# Patient Record
Sex: FEMALE | Race: WHITE | NOT HISPANIC OR LATINO | Employment: OTHER | URBAN - METROPOLITAN AREA
[De-identification: names, ages, dates, MRNs, and addresses within clinical notes are randomized per-mention and may not be internally consistent; named-entity substitution may affect disease eponyms.]

---

## 2021-11-10 ENCOUNTER — OFFICE VISIT (OUTPATIENT)
Dept: INTERNAL MEDICINE | Facility: CLINIC | Age: 66
End: 2021-11-10
Payer: MEDICARE

## 2021-11-10 ENCOUNTER — LAB VISIT (OUTPATIENT)
Dept: LAB | Facility: HOSPITAL | Age: 66
End: 2021-11-10
Attending: FAMILY MEDICINE
Payer: MEDICARE

## 2021-11-10 VITALS
DIASTOLIC BLOOD PRESSURE: 80 MMHG | HEART RATE: 92 BPM | OXYGEN SATURATION: 98 % | SYSTOLIC BLOOD PRESSURE: 162 MMHG | WEIGHT: 115.31 LBS | HEIGHT: 58 IN | BODY MASS INDEX: 24.2 KG/M2

## 2021-11-10 DIAGNOSIS — I10 HYPERTENSION, ESSENTIAL: Primary | ICD-10-CM

## 2021-11-10 DIAGNOSIS — Z12.11 SCREEN FOR COLON CANCER: ICD-10-CM

## 2021-11-10 DIAGNOSIS — Z12.31 ENCOUNTER FOR SCREENING MAMMOGRAM FOR MALIGNANT NEOPLASM OF BREAST: ICD-10-CM

## 2021-11-10 DIAGNOSIS — I10 HYPERTENSION, ESSENTIAL: ICD-10-CM

## 2021-11-10 DIAGNOSIS — Z23 NEED FOR 23-POLYVALENT PNEUMOCOCCAL POLYSACCHARIDE VACCINE: ICD-10-CM

## 2021-11-10 DIAGNOSIS — E11.9 CONTROLLED TYPE 2 DIABETES MELLITUS WITHOUT COMPLICATION, WITHOUT LONG-TERM CURRENT USE OF INSULIN: ICD-10-CM

## 2021-11-10 DIAGNOSIS — E78.5 HYPERLIPIDEMIA, UNSPECIFIED HYPERLIPIDEMIA TYPE: ICD-10-CM

## 2021-11-10 LAB
ALBUMIN/CREAT UR: NORMAL UG/MG (ref 0–30)
BACTERIA #/AREA URNS AUTO: ABNORMAL /HPF
BILIRUB UR QL STRIP: NEGATIVE
CLARITY UR REFRACT.AUTO: ABNORMAL
COLOR UR AUTO: YELLOW
CREAT UR-MCNC: 37 MG/DL (ref 15–325)
GLUCOSE UR QL STRIP: NEGATIVE
HGB UR QL STRIP: NEGATIVE
KETONES UR QL STRIP: NEGATIVE
LEUKOCYTE ESTERASE UR QL STRIP: ABNORMAL
MICROALBUMIN UR DL<=1MG/L-MCNC: <5 UG/ML
MICROSCOPIC COMMENT: ABNORMAL
NITRITE UR QL STRIP: NEGATIVE
PH UR STRIP: 6 [PH] (ref 5–8)
PROT UR QL STRIP: NEGATIVE
RBC #/AREA URNS AUTO: 0 /HPF (ref 0–4)
SP GR UR STRIP: 1.01 (ref 1–1.03)
SQUAMOUS #/AREA URNS AUTO: 2 /HPF
URN SPEC COLLECT METH UR: ABNORMAL
WBC #/AREA URNS AUTO: 26 /HPF (ref 0–5)

## 2021-11-10 PROCEDURE — 99203 OFFICE O/P NEW LOW 30 MIN: CPT | Mod: 25,S$GLB,, | Performed by: FAMILY MEDICINE

## 2021-11-10 PROCEDURE — 3008F BODY MASS INDEX DOCD: CPT | Mod: S$GLB,,, | Performed by: FAMILY MEDICINE

## 2021-11-10 PROCEDURE — 3288F PR FALLS RISK ASSESSMENT DOCUMENTED: ICD-10-PCS | Mod: S$GLB,,, | Performed by: FAMILY MEDICINE

## 2021-11-10 PROCEDURE — 3066F NEPHROPATHY DOC TX: CPT | Mod: S$GLB,,, | Performed by: FAMILY MEDICINE

## 2021-11-10 PROCEDURE — 82570 ASSAY OF URINE CREATININE: CPT | Performed by: FAMILY MEDICINE

## 2021-11-10 PROCEDURE — 3044F PR MOST RECENT HEMOGLOBIN A1C LEVEL <7.0%: ICD-10-PCS | Mod: S$GLB,,, | Performed by: FAMILY MEDICINE

## 2021-11-10 PROCEDURE — 90732 PPSV23 VACC 2 YRS+ SUBQ/IM: CPT | Mod: S$GLB,,, | Performed by: FAMILY MEDICINE

## 2021-11-10 PROCEDURE — 3288F FALL RISK ASSESSMENT DOCD: CPT | Mod: S$GLB,,, | Performed by: FAMILY MEDICINE

## 2021-11-10 PROCEDURE — 99999 PR PBB SHADOW E&M-NEW PATIENT-LVL III: CPT | Mod: PBBFAC,,, | Performed by: FAMILY MEDICINE

## 2021-11-10 PROCEDURE — 3008F PR BODY MASS INDEX (BMI) DOCUMENTED: ICD-10-PCS | Mod: S$GLB,,, | Performed by: FAMILY MEDICINE

## 2021-11-10 PROCEDURE — G0009 ADMIN PNEUMOCOCCAL VACCINE: HCPCS | Mod: S$GLB,,, | Performed by: FAMILY MEDICINE

## 2021-11-10 PROCEDURE — 1159F PR MEDICATION LIST DOCUMENTED IN MEDICAL RECORD: ICD-10-PCS | Mod: S$GLB,,, | Performed by: FAMILY MEDICINE

## 2021-11-10 PROCEDURE — 1101F PT FALLS ASSESS-DOCD LE1/YR: CPT | Mod: S$GLB,,, | Performed by: FAMILY MEDICINE

## 2021-11-10 PROCEDURE — 1159F MED LIST DOCD IN RCRD: CPT | Mod: S$GLB,,, | Performed by: FAMILY MEDICINE

## 2021-11-10 PROCEDURE — 1101F PR PT FALLS ASSESS DOC 0-1 FALLS W/OUT INJ PAST YR: ICD-10-PCS | Mod: S$GLB,,, | Performed by: FAMILY MEDICINE

## 2021-11-10 PROCEDURE — 99203 PR OFFICE/OUTPT VISIT, NEW, LEVL III, 30-44 MIN: ICD-10-PCS | Mod: 25,S$GLB,, | Performed by: FAMILY MEDICINE

## 2021-11-10 PROCEDURE — 3077F SYST BP >= 140 MM HG: CPT | Mod: S$GLB,,, | Performed by: FAMILY MEDICINE

## 2021-11-10 PROCEDURE — 3077F PR MOST RECENT SYSTOLIC BLOOD PRESSURE >= 140 MM HG: ICD-10-PCS | Mod: S$GLB,,, | Performed by: FAMILY MEDICINE

## 2021-11-10 PROCEDURE — 3044F HG A1C LEVEL LT 7.0%: CPT | Mod: S$GLB,,, | Performed by: FAMILY MEDICINE

## 2021-11-10 PROCEDURE — 3066F PR DOCUMENTATION OF TREATMENT FOR NEPHROPATHY: ICD-10-PCS | Mod: S$GLB,,, | Performed by: FAMILY MEDICINE

## 2021-11-10 PROCEDURE — 90732 PNEUMOCOCCAL POLYSACCHARIDE VACCINE 23-VALENT =>2YO SQ IM: ICD-10-PCS | Mod: S$GLB,,, | Performed by: FAMILY MEDICINE

## 2021-11-10 PROCEDURE — 1160F RVW MEDS BY RX/DR IN RCRD: CPT | Mod: S$GLB,,, | Performed by: FAMILY MEDICINE

## 2021-11-10 PROCEDURE — 3061F NEG MICROALBUMINURIA REV: CPT | Mod: S$GLB,,, | Performed by: FAMILY MEDICINE

## 2021-11-10 PROCEDURE — 81001 URINALYSIS AUTO W/SCOPE: CPT | Performed by: FAMILY MEDICINE

## 2021-11-10 PROCEDURE — 99999 PR PBB SHADOW E&M-NEW PATIENT-LVL III: ICD-10-PCS | Mod: PBBFAC,,, | Performed by: FAMILY MEDICINE

## 2021-11-10 PROCEDURE — 3079F DIAST BP 80-89 MM HG: CPT | Mod: S$GLB,,, | Performed by: FAMILY MEDICINE

## 2021-11-10 PROCEDURE — G0009 PNEUMOCOCCAL POLYSACCHARIDE VACCINE 23-VALENT =>2YO SQ IM: ICD-10-PCS | Mod: S$GLB,,, | Performed by: FAMILY MEDICINE

## 2021-11-10 PROCEDURE — 3079F PR MOST RECENT DIASTOLIC BLOOD PRESSURE 80-89 MM HG: ICD-10-PCS | Mod: S$GLB,,, | Performed by: FAMILY MEDICINE

## 2021-11-10 PROCEDURE — 1160F PR REVIEW ALL MEDS BY PRESCRIBER/CLIN PHARMACIST DOCUMENTED: ICD-10-PCS | Mod: S$GLB,,, | Performed by: FAMILY MEDICINE

## 2021-11-10 PROCEDURE — 3061F PR NEG MICROALBUMINURIA RESULT DOCUMENTED/REVIEW: ICD-10-PCS | Mod: S$GLB,,, | Performed by: FAMILY MEDICINE

## 2021-11-10 RX ORDER — METFORMIN HYDROCHLORIDE 500 MG/1
500 TABLET ORAL 2 TIMES DAILY WITH MEALS
COMMUNITY
End: 2022-01-25 | Stop reason: SDUPTHER

## 2021-11-10 RX ORDER — SIMVASTATIN 40 MG/1
40 TABLET, FILM COATED ORAL NIGHTLY
COMMUNITY
End: 2022-01-25 | Stop reason: SDUPTHER

## 2021-11-10 RX ORDER — CARVEDILOL 6.25 MG/1
6.25 TABLET ORAL 2 TIMES DAILY WITH MEALS
COMMUNITY
End: 2022-01-25 | Stop reason: SDUPTHER

## 2021-11-10 RX ORDER — LATANOPROST 50 UG/ML
1 SOLUTION/ DROPS OPHTHALMIC DAILY
COMMUNITY

## 2021-11-10 RX ORDER — BRIMONIDINE TARTRATE 1.5 MG/ML
1 SOLUTION/ DROPS OPHTHALMIC 2 TIMES DAILY
COMMUNITY

## 2021-11-10 RX ORDER — IRBESARTAN AND HYDROCHLOROTHIAZIDE 300; 12.5 MG/1; MG/1
1 TABLET, FILM COATED ORAL DAILY
COMMUNITY
End: 2022-01-25 | Stop reason: SDUPTHER

## 2021-11-10 RX ORDER — FENOFIBRATE 160 MG/1
160 TABLET ORAL DAILY
COMMUNITY
End: 2022-01-25 | Stop reason: SDUPTHER

## 2021-11-11 ENCOUNTER — LAB VISIT (OUTPATIENT)
Dept: LAB | Facility: HOSPITAL | Age: 66
End: 2021-11-11
Attending: FAMILY MEDICINE
Payer: MEDICARE

## 2021-11-11 DIAGNOSIS — I10 HYPERTENSION, ESSENTIAL: ICD-10-CM

## 2021-11-11 DIAGNOSIS — E11.9 CONTROLLED TYPE 2 DIABETES MELLITUS WITHOUT COMPLICATION, WITHOUT LONG-TERM CURRENT USE OF INSULIN: ICD-10-CM

## 2021-11-11 LAB
ALBUMIN SERPL BCP-MCNC: 3.9 G/DL (ref 3.5–5.2)
ALP SERPL-CCNC: 38 U/L (ref 55–135)
ALT SERPL W/O P-5'-P-CCNC: 11 U/L (ref 10–44)
ANION GAP SERPL CALC-SCNC: 7 MMOL/L (ref 8–16)
AST SERPL-CCNC: 17 U/L (ref 10–40)
BASOPHILS # BLD AUTO: 0.08 K/UL (ref 0–0.2)
BASOPHILS NFR BLD: 1.2 % (ref 0–1.9)
BILIRUB SERPL-MCNC: 0.8 MG/DL (ref 0.1–1)
BUN SERPL-MCNC: 21 MG/DL (ref 8–23)
CALCIUM SERPL-MCNC: 9.8 MG/DL (ref 8.7–10.5)
CHLORIDE SERPL-SCNC: 99 MMOL/L (ref 95–110)
CHOLEST SERPL-MCNC: 126 MG/DL (ref 120–199)
CHOLEST/HDLC SERPL: 3.2 {RATIO} (ref 2–5)
CO2 SERPL-SCNC: 25 MMOL/L (ref 23–29)
CREAT SERPL-MCNC: 1.3 MG/DL (ref 0.5–1.4)
DIFFERENTIAL METHOD: ABNORMAL
EOSINOPHIL # BLD AUTO: 0.5 K/UL (ref 0–0.5)
EOSINOPHIL NFR BLD: 8.4 % (ref 0–8)
ERYTHROCYTE [DISTWIDTH] IN BLOOD BY AUTOMATED COUNT: 13 % (ref 11.5–14.5)
EST. GFR  (AFRICAN AMERICAN): 49.4 ML/MIN/1.73 M^2
EST. GFR  (NON AFRICAN AMERICAN): 42.9 ML/MIN/1.73 M^2
ESTIMATED AVG GLUCOSE: 134 MG/DL (ref 68–131)
GLUCOSE SERPL-MCNC: 144 MG/DL (ref 70–110)
HBA1C MFR BLD: 6.3 % (ref 4–5.6)
HCT VFR BLD AUTO: 34.1 % (ref 37–48.5)
HDLC SERPL-MCNC: 40 MG/DL (ref 40–75)
HDLC SERPL: 31.7 % (ref 20–50)
HGB BLD-MCNC: 11.4 G/DL (ref 12–16)
IMM GRANULOCYTES # BLD AUTO: 0.02 K/UL (ref 0–0.04)
IMM GRANULOCYTES NFR BLD AUTO: 0.3 % (ref 0–0.5)
LDLC SERPL CALC-MCNC: 60.8 MG/DL (ref 63–159)
LYMPHOCYTES # BLD AUTO: 2.8 K/UL (ref 1–4.8)
LYMPHOCYTES NFR BLD: 43.8 % (ref 18–48)
MCH RBC QN AUTO: 29.4 PG (ref 27–31)
MCHC RBC AUTO-ENTMCNC: 33.4 G/DL (ref 32–36)
MCV RBC AUTO: 88 FL (ref 82–98)
MONOCYTES # BLD AUTO: 0.3 K/UL (ref 0.3–1)
MONOCYTES NFR BLD: 4.7 % (ref 4–15)
NEUTROPHILS # BLD AUTO: 2.7 K/UL (ref 1.8–7.7)
NEUTROPHILS NFR BLD: 41.6 % (ref 38–73)
NONHDLC SERPL-MCNC: 86 MG/DL
NRBC BLD-RTO: 0 /100 WBC
PLATELET # BLD AUTO: 290 K/UL (ref 150–450)
PMV BLD AUTO: 9.3 FL (ref 9.2–12.9)
POTASSIUM SERPL-SCNC: 4 MMOL/L (ref 3.5–5.1)
PROT SERPL-MCNC: 6.9 G/DL (ref 6–8.4)
RBC # BLD AUTO: 3.88 M/UL (ref 4–5.4)
SODIUM SERPL-SCNC: 131 MMOL/L (ref 136–145)
TRIGL SERPL-MCNC: 126 MG/DL (ref 30–150)
TSH SERPL DL<=0.005 MIU/L-ACNC: 1.44 UIU/ML (ref 0.4–4)
WBC # BLD AUTO: 6.42 K/UL (ref 3.9–12.7)

## 2021-11-11 PROCEDURE — 80061 LIPID PANEL: CPT | Performed by: FAMILY MEDICINE

## 2021-11-11 PROCEDURE — 80053 COMPREHEN METABOLIC PANEL: CPT | Performed by: FAMILY MEDICINE

## 2021-11-11 PROCEDURE — 86803 HEPATITIS C AB TEST: CPT | Performed by: FAMILY MEDICINE

## 2021-11-11 PROCEDURE — 83036 HEMOGLOBIN GLYCOSYLATED A1C: CPT | Performed by: FAMILY MEDICINE

## 2021-11-11 PROCEDURE — 84443 ASSAY THYROID STIM HORMONE: CPT | Performed by: FAMILY MEDICINE

## 2021-11-11 PROCEDURE — 36415 COLL VENOUS BLD VENIPUNCTURE: CPT | Performed by: FAMILY MEDICINE

## 2021-11-11 PROCEDURE — 85025 COMPLETE CBC W/AUTO DIFF WBC: CPT | Performed by: FAMILY MEDICINE

## 2021-11-11 PROCEDURE — 87389 HIV-1 AG W/HIV-1&-2 AB AG IA: CPT | Performed by: FAMILY MEDICINE

## 2021-11-12 LAB
HCV AB SERPL QL IA: NEGATIVE
HIV 1+2 AB+HIV1 P24 AG SERPL QL IA: NEGATIVE

## 2021-11-15 DIAGNOSIS — R82.998 URINE WBC INCREASED: Primary | ICD-10-CM

## 2022-01-25 NOTE — TELEPHONE ENCOUNTER
No new care gaps identified.  Powered by [employer]. Reference number: [id number].   1/25/2022 3:41:12 PM CST

## 2022-01-25 NOTE — TELEPHONE ENCOUNTER
----- Message from Nelida Haven sent at 1/25/2022  3:31 PM CST -----  Contact: Self   Requesting an RX refill or new RX.  Is this a refill or new RX:   RX name and strength (copy/paste from chart):  all rx  Is this a 30 day or 90 day RX:   Patient advised that in the future they can use their [facility] account to request a refill?:    Patient advised that RX refills can take up to 72 hours?: yes   Pharmacy name and phone # (copy/paste from chart):  [id number]  [address], [place] [id number] [phone]  Comments:

## 2022-01-26 RX ORDER — METFORMIN HYDROCHLORIDE 500 MG/1
500 TABLET ORAL 2 TIMES DAILY WITH MEALS
Qty: 180 TABLET | Refills: 0 | Status: SHIPPED | OUTPATIENT
Start: 2022-01-26 | End: 2022-10-06 | Stop reason: SDUPTHER

## 2022-01-26 RX ORDER — IRBESARTAN AND HYDROCHLOROTHIAZIDE 300; 12.5 MG/1; MG/1
1 TABLET, FILM COATED ORAL DAILY
Qty: 90 TABLET | Refills: 0 | Status: SHIPPED | OUTPATIENT
Start: 2022-01-26 | End: 2022-10-06 | Stop reason: SDUPTHER

## 2022-01-26 RX ORDER — FENOFIBRATE 160 MG/1
160 TABLET ORAL DAILY
Qty: 90 TABLET | Refills: 0 | Status: SHIPPED | OUTPATIENT
Start: 2022-01-26 | End: 2022-07-26 | Stop reason: SDUPTHER

## 2022-01-26 RX ORDER — SIMVASTATIN 40 MG/1
40 TABLET, FILM COATED ORAL NIGHTLY
Qty: 90 TABLET | Refills: 0 | Status: SHIPPED | OUTPATIENT
Start: 2022-01-26 | End: 2022-07-26 | Stop reason: SDUPTHER

## 2022-01-26 RX ORDER — CARVEDILOL 6.25 MG/1
6.25 TABLET ORAL 2 TIMES DAILY WITH MEALS
Qty: 180 TABLET | Refills: 0 | Status: SHIPPED | OUTPATIENT
Start: 2022-01-26 | End: 2022-10-06 | Stop reason: SDUPTHER

## 2022-05-09 ENCOUNTER — TELEPHONE (OUTPATIENT)
Dept: INTERNAL MEDICINE | Facility: CLINIC | Age: 67
End: 2022-05-09
Payer: MEDICARE

## 2022-05-09 NOTE — TELEPHONE ENCOUNTER
----- Message from Genie Atkinson sent at 5/9/2022  3:07 PM CDT -----  Regarding: refill request  Contact: patient [phone]  Requesting an RX refill or new RX.  Is this a refill or new RX: refill  RX name and strength (irbesartan-hydrochlorothiazide (AVALIDE) 300-12.5 mg per tablet  Is this a 30 day or 90 day RX: 90  Pharm[facility] name and phone #   [facility] [address] [place] [address] AT [address] [address]  Phone: [phone] Fax: [phone]  The doctors have asked that we provide their patients with the following 2 reminders -- prescription refills can take up to 72 hours, and a friendly reminder that in the future you can use your [facility] account to request refills: yes    2  Requesting an RX refill or new RX.  Is this a refill or new RX: refill  RX name and strength (simvastatin (ZOCOR) 40 MG tablet  Is this a 30 day or 90 day RX: 90  Pharmacy name and phone #   [facility] [address] [place] [address] [employer] AT [address] [address]  Phone: [phone] Fax: [phone]  The doctors have asked that we provide their patients with the following 2 reminders -- prescription refills can take up to 72 hours, and a friendly reminder that in the future you can use your [facility] account to request refills: yes      3  Requesting an RX refill or new RX.  Is this a refill or new RX: refill  RX name and strength (carvediloL (COREG) 6.25 MG tablet    Is this a 30 day or 90 day RX: 90  Pharmacy name and phone #   [facility] [address] [place], [place] [address] AT [address] [address]  Phone: [phone] Fax: [phone]  The doctors have asked that we provide their patients with the following 2 reminders -- prescription refills can take up to 72 hours, and a friendly reminder that in  the future you can use your [facility] account to request refills: yes      4  Requesting an RX refill or new RX.  Is this a refill or new RX: refill  RX name and strength fenofibrate 160 MG Tab   Is this a 30 day or 90 day RX: 90  Pharmacy name and phone #   [id number] [address] AT [address] [address]  Phone: [phone] Fax: [phone]  The doctors have asked that we provide their patients with the following 2 reminders -- prescription refills can take up to 72 hours, and a friendly reminder that in the future you can use your [facility] account to request refills: yes    5  Requesting an RX refill or new RX.  Is this a refill or new RX: refill  RX name and strength (metFORMIN (GLUCOPHAGE) 500 MG tablet  Is this a 30 day or 90 day RX: 90  Pharmacy name and phone #   [id number] [address] AT [address] [address]  Phone: [phone] Fax: [phone]  The doctors have asked that we provide their patients with the following 2 reminders -- prescription refills can take up to 72 hours, and a friendly reminder that in the future you can use your [facility] account to request refills: yes

## 2022-07-26 ENCOUNTER — OFFICE VISIT (OUTPATIENT)
Dept: INTERNAL MEDICINE | Facility: CLINIC | Age: 67
End: 2022-07-26
Payer: MEDICARE

## 2022-07-26 VITALS
WEIGHT: 116.19 LBS | BODY MASS INDEX: 23.42 KG/M2 | OXYGEN SATURATION: 96 % | DIASTOLIC BLOOD PRESSURE: 76 MMHG | HEIGHT: 59 IN | SYSTOLIC BLOOD PRESSURE: 132 MMHG | HEART RATE: 83 BPM

## 2022-07-26 DIAGNOSIS — I10 HYPERTENSION, ESSENTIAL: Primary | ICD-10-CM

## 2022-07-26 DIAGNOSIS — Z29.9 PREVENTIVE MEASURE: ICD-10-CM

## 2022-07-26 DIAGNOSIS — E11.9 CONTROLLED TYPE 2 DIABETES MELLITUS WITHOUT COMPLICATION, WITHOUT LONG-TERM CURRENT USE OF INSULIN: ICD-10-CM

## 2022-07-26 DIAGNOSIS — E78.5 HYPERLIPIDEMIA, UNSPECIFIED HYPERLIPIDEMIA TYPE: ICD-10-CM

## 2022-07-26 DIAGNOSIS — E28.39 ESTROGEN DEFICIENCY: ICD-10-CM

## 2022-07-26 DIAGNOSIS — Z12.11 COLON CANCER SCREENING: ICD-10-CM

## 2022-07-26 DIAGNOSIS — I10 PRIMARY HYPERTENSION: ICD-10-CM

## 2022-07-26 DIAGNOSIS — Z12.31 ENCOUNTER FOR SCREENING MAMMOGRAM FOR MALIGNANT NEOPLASM OF BREAST: ICD-10-CM

## 2022-07-26 DIAGNOSIS — E78.2 MIXED HYPERLIPIDEMIA: ICD-10-CM

## 2022-07-26 LAB
ALBUMIN/CREAT UR: 13.4 UG/MG (ref 0–30)
CREAT UR-MCNC: 82 MG/DL (ref 15–325)
MICROALBUMIN UR DL<=1MG/L-MCNC: 11 UG/ML

## 2022-07-26 PROCEDURE — G0009 ADMIN PNEUMOCOCCAL VACCINE: HCPCS | Mod: 59,S$GLB,, | Performed by: INTERNAL MEDICINE

## 2022-07-26 PROCEDURE — 99999 PR PBB SHADOW E&M-EST. PATIENT-LVL V: CPT | Mod: PBBFAC,,, | Performed by: INTERNAL MEDICINE

## 2022-07-26 PROCEDURE — 1159F MED LIST DOCD IN RCRD: CPT | Mod: CPTII,S$GLB,, | Performed by: INTERNAL MEDICINE

## 2022-07-26 PROCEDURE — 90471 IMMUNIZATION ADMIN: CPT | Mod: GA,S$GLB,, | Performed by: INTERNAL MEDICINE

## 2022-07-26 PROCEDURE — 3078F PR MOST RECENT DIASTOLIC BLOOD PRESSURE < 80 MM HG: ICD-10-PCS | Mod: CPTII,S$GLB,, | Performed by: INTERNAL MEDICINE

## 2022-07-26 PROCEDURE — 3075F SYST BP GE 130 - 139MM HG: CPT | Mod: CPTII,S$GLB,, | Performed by: INTERNAL MEDICINE

## 2022-07-26 PROCEDURE — 1159F PR MEDICATION LIST DOCUMENTED IN MEDICAL RECORD: ICD-10-PCS | Mod: CPTII,S$GLB,, | Performed by: INTERNAL MEDICINE

## 2022-07-26 PROCEDURE — 3008F BODY MASS INDEX DOCD: CPT | Mod: CPTII,S$GLB,, | Performed by: INTERNAL MEDICINE

## 2022-07-26 PROCEDURE — 1101F PR PT FALLS ASSESS DOC 0-1 FALLS W/OUT INJ PAST YR: ICD-10-PCS | Mod: CPTII,S$GLB,, | Performed by: INTERNAL MEDICINE

## 2022-07-26 PROCEDURE — 99215 PR OFFICE/OUTPT VISIT, EST, LEVL V, 40-54 MIN: ICD-10-PCS | Mod: 25,S$GLB,, | Performed by: INTERNAL MEDICINE

## 2022-07-26 PROCEDURE — 99215 OFFICE O/P EST HI 40 MIN: CPT | Mod: 25,S$GLB,, | Performed by: INTERNAL MEDICINE

## 2022-07-26 PROCEDURE — 1101F PT FALLS ASSESS-DOCD LE1/YR: CPT | Mod: CPTII,S$GLB,, | Performed by: INTERNAL MEDICINE

## 2022-07-26 PROCEDURE — 3078F DIAST BP <80 MM HG: CPT | Mod: CPTII,S$GLB,, | Performed by: INTERNAL MEDICINE

## 2022-07-26 PROCEDURE — 3061F NEG MICROALBUMINURIA REV: CPT | Mod: CPTII,S$GLB,, | Performed by: INTERNAL MEDICINE

## 2022-07-26 PROCEDURE — 90715 TDAP VACCINE 7 YRS/> IM: CPT | Mod: GA,S$GLB,, | Performed by: INTERNAL MEDICINE

## 2022-07-26 PROCEDURE — 90677 PCV20 VACCINE IM: CPT | Mod: S$GLB,,, | Performed by: INTERNAL MEDICINE

## 2022-07-26 PROCEDURE — 3075F PR MOST RECENT SYSTOLIC BLOOD PRESS GE 130-139MM HG: ICD-10-PCS | Mod: CPTII,S$GLB,, | Performed by: INTERNAL MEDICINE

## 2022-07-26 PROCEDURE — 1126F AMNT PAIN NOTED NONE PRSNT: CPT | Mod: CPTII,S$GLB,, | Performed by: INTERNAL MEDICINE

## 2022-07-26 PROCEDURE — 3288F PR FALLS RISK ASSESSMENT DOCUMENTED: ICD-10-PCS | Mod: CPTII,S$GLB,, | Performed by: INTERNAL MEDICINE

## 2022-07-26 PROCEDURE — 90471 TDAP VACCINE GREATER THAN OR EQUAL TO 7YO IM: ICD-10-PCS | Mod: GA,S$GLB,, | Performed by: INTERNAL MEDICINE

## 2022-07-26 PROCEDURE — 99999 PR PBB SHADOW E&M-EST. PATIENT-LVL V: ICD-10-PCS | Mod: PBBFAC,,, | Performed by: INTERNAL MEDICINE

## 2022-07-26 PROCEDURE — 1126F PR PAIN SEVERITY QUANTIFIED, NO PAIN PRESENT: ICD-10-PCS | Mod: CPTII,S$GLB,, | Performed by: INTERNAL MEDICINE

## 2022-07-26 PROCEDURE — 3066F NEPHROPATHY DOC TX: CPT | Mod: CPTII,S$GLB,, | Performed by: INTERNAL MEDICINE

## 2022-07-26 PROCEDURE — 82570 ASSAY OF URINE CREATININE: CPT | Performed by: INTERNAL MEDICINE

## 2022-07-26 PROCEDURE — 3288F FALL RISK ASSESSMENT DOCD: CPT | Mod: CPTII,S$GLB,, | Performed by: INTERNAL MEDICINE

## 2022-07-26 PROCEDURE — 3061F PR NEG MICROALBUMINURIA RESULT DOCUMENTED/REVIEW: ICD-10-PCS | Mod: CPTII,S$GLB,, | Performed by: INTERNAL MEDICINE

## 2022-07-26 PROCEDURE — G0009 PNEUMOCOCCAL CONJUGATE VACCINE 20-VALENT: ICD-10-PCS | Mod: 59,S$GLB,, | Performed by: INTERNAL MEDICINE

## 2022-07-26 PROCEDURE — 3008F PR BODY MASS INDEX (BMI) DOCUMENTED: ICD-10-PCS | Mod: CPTII,S$GLB,, | Performed by: INTERNAL MEDICINE

## 2022-07-26 PROCEDURE — 82043 UR ALBUMIN QUANTITATIVE: CPT | Performed by: INTERNAL MEDICINE

## 2022-07-26 PROCEDURE — 90715 TDAP VACCINE GREATER THAN OR EQUAL TO 7YO IM: ICD-10-PCS | Mod: GA,S$GLB,, | Performed by: INTERNAL MEDICINE

## 2022-07-26 PROCEDURE — 90677 PNEUMOCOCCAL CONJUGATE VACCINE 20-VALENT: ICD-10-PCS | Mod: S$GLB,,, | Performed by: INTERNAL MEDICINE

## 2022-07-26 PROCEDURE — 3066F PR DOCUMENTATION OF TREATMENT FOR NEPHROPATHY: ICD-10-PCS | Mod: CPTII,S$GLB,, | Performed by: INTERNAL MEDICINE

## 2022-07-26 RX ORDER — FENOFIBRATE 160 MG/1
160 TABLET ORAL DAILY
Qty: 90 TABLET | Refills: 2 | Status: SHIPPED | OUTPATIENT
Start: 2022-07-26 | End: 2022-12-28 | Stop reason: SDUPTHER

## 2022-07-26 RX ORDER — SIMVASTATIN 40 MG/1
40 TABLET, FILM COATED ORAL NIGHTLY
Qty: 90 TABLET | Refills: 2 | Status: SHIPPED | OUTPATIENT
Start: 2022-07-26 | End: 2022-12-28 | Stop reason: SDUPTHER

## 2022-07-26 NOTE — PROGRESS NOTES
Subjective:       Patient ID: Ebony Angel is a 67 y.o. female.    Chief Complaint:   Annual Exam    HPI: Mrs Angel presents as new pt to establish care  She is doing well and without complaints  HTN:Med TX 1-Avalide 300/12.5mg QD, 2-Corge 6.25mg BID  DM: Med Tx 1- Glucophage 500mg BID,   HLD: Med Tx 1- Zocor 40mg QD, 2-Fenofibrate 160mg QD          Note: She did run out of these medications x last 1-2 months since prior PCP has left the clinic  Past Medical, Surgical, Social History: Please see as stated in Epic chart which has been reviewed.    Current Outpatient Medications   Medication Sig Dispense Refill    brimonidine 0.15 % OPTH DROP (ALPHAGAN) 0.15 % ophthalmic solution 1 drop 2 (two) times a day.      carvediloL (COREG) 6.25 MG tablet Take 1 tablet (6.25 mg total) by mouth 2 (two) times daily with meals. 180 tablet 0    irbesartan-hydrochlorothiazide (AVALIDE) 300-12.5 mg per tablet Take 1 tablet by mouth once daily. 90 tablet 0    latanoprost 0.005 % ophthalmic solution 1 drop once daily.      metFORMIN (GLUCOPHAGE) 500 MG tablet Take 1 tablet (500 mg total) by mouth 2 (two) times daily with meals. 180 tablet 0    fenofibrate 160 MG Tab Take 1 tablet (160 mg total) by mouth once daily. 90 tablet 2    simvastatin (ZOCOR) 40 MG tablet Take 1 tablet (40 mg total) by mouth every evening. 90 tablet 2     No current facility-administered medications for this visit.       Review of Systems   Respiratory: Negative.    Cardiovascular: Negative.    Gastrointestinal: Negative.    Musculoskeletal: Positive for arthralgias.        Arthritis: Hands, left knee, and occasionally right hip   Neurological: Negative.        Objective:      Lab Results   Component Value Date    WBC 6.42 11/11/2021    HGB 11.4 (L) 11/11/2021    HCT 34.1 (L) 11/11/2021     11/11/2021    CHOL 126 11/11/2021    TRIG 126 11/11/2021    HDL 40 11/11/2021    ALT 11 11/11/2021    AST 17 11/11/2021     (L)  "11/11/2021    K 4.0 11/11/2021    CL 99 11/11/2021    CREATININE 1.3 11/11/2021    BUN 21 11/11/2021    CO2 25 11/11/2021    TSH 1.440 11/11/2021    HGBA1C 6.3 (H) 11/11/2021     Physical Exam  Vitals reviewed.   Constitutional:       Appearance: Normal appearance.   HENT:      Head: Normocephalic and atraumatic.      Mouth/Throat:      Mouth: Mucous membranes are dry.      Pharynx: No posterior oropharyngeal erythema.   Cardiovascular:      Rate and Rhythm: Normal rate and regular rhythm.   Pulmonary:      Effort: Pulmonary effort is normal.      Breath sounds: Normal breath sounds. No wheezing.   Chest:      Chest wall: No tenderness.   Abdominal:      General: Abdomen is flat. Bowel sounds are normal.      Palpations: Abdomen is soft. There is no mass.      Tenderness: There is no abdominal tenderness.   Musculoskeletal:         General: Deformity present. No swelling.      Cervical back: Normal range of motion and neck supple. No muscular tenderness.      Right lower leg: No edema.      Left lower leg: No edema.      Comments: +OA type changes of the PIP/DIP joints of both hand but good ROM and no point tenderness   Lymphadenopathy:      Cervical: No cervical adenopathy.   Skin:     General: Skin is warm and dry.   Neurological:      General: No focal deficit present.      Mental Status: She is alert.   Psychiatric:         Mood and Affect: Mood normal.       Breasts:  On gross visualization, normal; on palpation no masses tenderness or nipple discharge    Vital Signs  Pulse: 83  SpO2: 96 %  BP: 132/76  BP Location: Right arm  Patient Position: Sitting  Pain Score: 0-No pain  Height and Weight  Height: 4' 11" (149.9 cm)  Weight: 52.7 kg (116 lb 2.9 oz)  BSA (Calculated - sq m): 1.48 sq meters  BMI (Calculated): 23.5  Weight in (lb) to have BMI = 25: 123.5]    Assessment:       1. Hypertension, essential    2. Controlled type 2 diabetes mellitus without complication, without long-term current use of insulin    3. " Hyperlipidemia, unspecified hyperlipidemia type    4. Preventive measure    5. Colon cancer screening    6. Encounter for screening mammogram for malignant neoplasm of breast    7. Estrogen deficiency        Plan:     Health Maintenance   Topic Date Due    Foot Exam  Never done    Mammogram  Never done    DEXA Scan  Never done    Hemoglobin A1c  05/11/2022    Lipid Panel  11/11/2022    Eye Exam  03/23/2023    Low Dose Statin  07/26/2023    TETANUS VACCINE  07/26/2032    Hepatitis C Screening  Completed        Ebony was seen today for annual exam.    Diagnoses and all orders for this visit:    Hypertension, essential/controlled        -     Continue:Med TX 1-Avalide 300/12.5mg QD, 2-Corge 6.25mg BID  -     Comprehensive Metabolic Panel; Future  -     CBC Auto Differential; Future  -     TSH; Future    Controlled type 2 diabetes mellitus without complication, without long-term current use of insulin        -     Continue : Glucophage 500mg BID  -     Microalbumin/Creatinine Ratio, Urine  -     Hemoglobin A1C; Future  -     Microalbumin/Creatinine Ratio, Urine    Hyperlipidemia, unspecified hyperlipidemia type  -     Continue: simvastatin (ZOCOR) 40 MG tablet; Take 1 tablet (40 mg total) by mouth every evening.  -     Continue: fenofibrate 160 MG Tab; Take 1 tablet (160 mg total) by mouth once daily.  -     Comprehensive Metabolic Panel; Future  -     Lipid Panel; Future    Preventive measure  -     Pneumococcal Conjugate Vaccine (20 Valent) (IM)  -     Tdap Vaccine    Colon cancer screening/No Colonoscopy to date  -     Fecal Immunochemical Test (iFOBT); Future    Encounter for screening mammogram for malignant neoplasm of breast  -     Mammo Digital Screening Bilat w/ Lang; Future    Estrogen deficiency  -     DXA Bone Density Spine And Hip; Future    Health Maintenance        -     RTC x 4 months with 1 week prior fasting lab

## 2022-09-21 ENCOUNTER — HOSPITAL ENCOUNTER (OUTPATIENT)
Dept: RADIOLOGY | Facility: HOSPITAL | Age: 67
Discharge: HOME | End: 2022-09-21
Attending: INTERNAL MEDICINE
Payer: MEDICARE

## 2022-09-21 DIAGNOSIS — Z12.31 ENCOUNTER FOR SCREENING MAMMOGRAM FOR MALIGNANT NEOPLASM OF BREAST: ICD-10-CM

## 2022-09-21 PROCEDURE — 77063 BREAST TOMOSYNTHESIS BI: CPT | Mod: 26,,, | Performed by: RADIOLOGY

## 2022-09-21 PROCEDURE — 77067 SCR MAMMO BI INCL CAD: CPT | Mod: 26,,, | Performed by: RADIOLOGY

## 2022-09-21 PROCEDURE — 77067 MAMMO DIGITAL SCREENING BILAT WITH TOMO: ICD-10-PCS | Mod: 26,,, | Performed by: RADIOLOGY

## 2022-09-21 PROCEDURE — 77063 MAMMO DIGITAL SCREENING BILAT WITH TOMO: ICD-10-PCS | Mod: 26,,, | Performed by: RADIOLOGY

## 2022-09-21 PROCEDURE — 77063 BREAST TOMOSYNTHESIS BI: CPT | Mod: TC,PO

## 2022-10-06 RX ORDER — METFORMIN HYDROCHLORIDE 500 MG/1
500 TABLET ORAL 2 TIMES DAILY WITH MEALS
Qty: 180 TABLET | Refills: 0 | Status: SHIPPED | OUTPATIENT
Start: 2022-10-06 | End: 2022-12-28 | Stop reason: SDUPTHER

## 2022-10-06 RX ORDER — IRBESARTAN AND HYDROCHLOROTHIAZIDE 300; 12.5 MG/1; MG/1
1 TABLET, FILM COATED ORAL DAILY
Qty: 90 TABLET | Refills: 0 | Status: SHIPPED | OUTPATIENT
Start: 2022-10-06 | End: 2023-01-04

## 2022-10-06 RX ORDER — CARVEDILOL 6.25 MG/1
6.25 TABLET ORAL 2 TIMES DAILY WITH MEALS
Qty: 180 TABLET | Refills: 0 | Status: SHIPPED | OUTPATIENT
Start: 2022-10-06 | End: 2022-12-28 | Stop reason: SDUPTHER

## 2022-10-06 NOTE — TELEPHONE ENCOUNTER
----- Message from Valerie Lopez sent at 10/6/2022  3:01 PM CDT -----  Contact: [phone]  Requesting an RX refill or new RX.  Is this a refill or new RX: refill  RX name and strength (copy/paste from chart):      irbesartan-hydrochlorothiazide (AVALIDE) 300-12.5 mg per tablet    metFORMIN (GLUCOPHAGE) 500 MG tablet    carvediloL (COREG) 6.25 MG tablet  Is this a 30 day or 90 day RX: 90  Pharmacy name and phone # (copy/paste from chart):    [facility] [id number] [place] [address] [address] AT [facility] [address] [place] [address] [facility] [address]  Phone: [phone] Fax: [phone]      The doctors have asked that we provide their patients with the following 2 reminders -- prescription refills can take up to 72 hours, and a friendly reminder that in the future you can use your [facility] account to request refills: call

## 2022-10-06 NOTE — TELEPHONE ENCOUNTER
Care Due:                  Date            Visit Type   Department     Provider  --------------------------------------------------------------------------------                                NP -                              PRIMARY      [facility] PRIMARY  Last Visit: 07-      CARE ([facility])   CARE           Iban Martinez                               -                              [facility] PRIMARY  Next Visit: 12-      CARE ([facility])   [facility]           [facility]  Test          Frequency    Reason                     Performed    Due Date  --------------------------------------------------------------------------------    CBC.........  12 months..  fenofibrate..............  11- 11-    CMP.........  12 months..  fenofibrate, simvastatin.  11- 11-    Lipid Panel.  12 months..  fenofibrate, simvastatin.  11- 11-    [facility] Embedded Care Gaps. Reference number: [id number]. 10/06/2022   3:35:01 PM CDT

## 2022-10-17 ENCOUNTER — TELEPHONE (OUTPATIENT)
Dept: INTERNAL MEDICINE | Facility: CLINIC | Age: 67
End: 2022-10-17
Payer: MEDICARE

## 2022-10-17 DIAGNOSIS — M81.0 OSTEOPOROSIS, UNSPECIFIED OSTEOPOROSIS TYPE, UNSPECIFIED PATHOLOGICAL FRACTURE PRESENCE: Primary | ICD-10-CM

## 2022-10-17 RX ORDER — IBANDRONATE SODIUM 150 MG/1
150 TABLET, FILM COATED ORAL
Qty: 1 TABLET | Refills: 4 | Status: SHIPPED | OUTPATIENT
Start: 2022-10-17 | End: 2022-12-28

## 2022-10-17 NOTE — TELEPHONE ENCOUNTER
Spoke with Mrs Angel ie her Cole Dexa study(9/29)-showed osteoporosis with Hip FRAX score=4.1% and total FRAX score=21%.   'Discussed options and have erx'd in: Boniva 150mg QWeek.  She will start Calcium/Vit D daily and start regular exercise as well.  'Will recheck Dexa x 2 years

## 2022-12-20 ENCOUNTER — LAB VISIT (OUTPATIENT)
Dept: LAB | Facility: HOSPITAL | Age: 67
End: 2022-12-20
Attending: INTERNAL MEDICINE
Payer: MEDICARE

## 2022-12-20 DIAGNOSIS — I10 HYPERTENSION, ESSENTIAL: ICD-10-CM

## 2022-12-20 DIAGNOSIS — E11.9 CONTROLLED TYPE 2 DIABETES MELLITUS WITHOUT COMPLICATION, WITHOUT LONG-TERM CURRENT USE OF INSULIN: ICD-10-CM

## 2022-12-20 DIAGNOSIS — E78.5 HYPERLIPIDEMIA, UNSPECIFIED HYPERLIPIDEMIA TYPE: ICD-10-CM

## 2022-12-20 LAB
ALBUMIN SERPL BCP-MCNC: 3.8 G/DL (ref 3.5–5.2)
ALP SERPL-CCNC: 47 U/L (ref 55–135)
ALT SERPL W/O P-5'-P-CCNC: 11 U/L (ref 10–44)
ANION GAP SERPL CALC-SCNC: 11 MMOL/L (ref 8–16)
AST SERPL-CCNC: 23 U/L (ref 10–40)
BASOPHILS # BLD AUTO: 0.04 K/UL (ref 0–0.2)
BASOPHILS NFR BLD: 0.8 % (ref 0–1.9)
BILIRUB SERPL-MCNC: 0.4 MG/DL (ref 0.1–1)
BUN SERPL-MCNC: 13 MG/DL (ref 8–23)
CALCIUM SERPL-MCNC: 10.3 MG/DL (ref 8.7–10.5)
CHLORIDE SERPL-SCNC: 95 MMOL/L (ref 95–110)
CHOLEST SERPL-MCNC: 107 MG/DL (ref 120–199)
CHOLEST/HDLC SERPL: 2.7 {RATIO} (ref 2–5)
CO2 SERPL-SCNC: 22 MMOL/L (ref 23–29)
CREAT SERPL-MCNC: 1.4 MG/DL (ref 0.5–1.4)
DIFFERENTIAL METHOD: ABNORMAL
EOSINOPHIL # BLD AUTO: 0.2 K/UL (ref 0–0.5)
EOSINOPHIL NFR BLD: 3.1 % (ref 0–8)
ERYTHROCYTE [DISTWIDTH] IN BLOOD BY AUTOMATED COUNT: 12.7 % (ref 11.5–14.5)
EST. GFR  (NO RACE VARIABLE): 41.2 ML/MIN/1.73 M^2
ESTIMATED AVG GLUCOSE: 128 MG/DL (ref 68–131)
GLUCOSE SERPL-MCNC: 140 MG/DL (ref 70–110)
HBA1C MFR BLD: 6.1 % (ref 4–5.6)
HCT VFR BLD AUTO: 33.8 % (ref 37–48.5)
HDLC SERPL-MCNC: 40 MG/DL (ref 40–75)
HDLC SERPL: 37.4 % (ref 20–50)
HGB BLD-MCNC: 11.1 G/DL (ref 12–16)
IMM GRANULOCYTES # BLD AUTO: 0.01 K/UL (ref 0–0.04)
IMM GRANULOCYTES NFR BLD AUTO: 0.2 % (ref 0–0.5)
LDLC SERPL CALC-MCNC: 49.2 MG/DL (ref 63–159)
LYMPHOCYTES # BLD AUTO: 2.6 K/UL (ref 1–4.8)
LYMPHOCYTES NFR BLD: 49.2 % (ref 18–48)
MCH RBC QN AUTO: 29.4 PG (ref 27–31)
MCHC RBC AUTO-ENTMCNC: 32.8 G/DL (ref 32–36)
MCV RBC AUTO: 90 FL (ref 82–98)
MONOCYTES # BLD AUTO: 0.5 K/UL (ref 0.3–1)
MONOCYTES NFR BLD: 9.7 % (ref 4–15)
NEUTROPHILS # BLD AUTO: 1.9 K/UL (ref 1.8–7.7)
NEUTROPHILS NFR BLD: 37 % (ref 38–73)
NONHDLC SERPL-MCNC: 67 MG/DL
NRBC BLD-RTO: 0 /100 WBC
PLATELET # BLD AUTO: 244 K/UL (ref 150–450)
PMV BLD AUTO: 9.3 FL (ref 9.2–12.9)
POTASSIUM SERPL-SCNC: 4.1 MMOL/L (ref 3.5–5.1)
PROT SERPL-MCNC: 6.6 G/DL (ref 6–8.4)
RBC # BLD AUTO: 3.77 M/UL (ref 4–5.4)
SODIUM SERPL-SCNC: 128 MMOL/L (ref 136–145)
TRIGL SERPL-MCNC: 89 MG/DL (ref 30–150)
TSH SERPL DL<=0.005 MIU/L-ACNC: 1.5 UIU/ML (ref 0.4–4)
WBC # BLD AUTO: 5.24 K/UL (ref 3.9–12.7)

## 2022-12-20 PROCEDURE — 85025 COMPLETE CBC W/AUTO DIFF WBC: CPT | Performed by: INTERNAL MEDICINE

## 2022-12-20 PROCEDURE — 83036 HEMOGLOBIN GLYCOSYLATED A1C: CPT | Performed by: INTERNAL MEDICINE

## 2022-12-20 PROCEDURE — 84443 ASSAY THYROID STIM HORMONE: CPT | Performed by: INTERNAL MEDICINE

## 2022-12-20 PROCEDURE — 36415 COLL VENOUS BLD VENIPUNCTURE: CPT | Performed by: INTERNAL MEDICINE

## 2022-12-20 PROCEDURE — 80053 COMPREHEN METABOLIC PANEL: CPT | Performed by: INTERNAL MEDICINE

## 2022-12-20 PROCEDURE — 80061 LIPID PANEL: CPT | Performed by: INTERNAL MEDICINE

## 2022-12-28 ENCOUNTER — LAB VISIT (OUTPATIENT)
Dept: LAB | Facility: HOSPITAL | Age: 67
End: 2022-12-28
Attending: INTERNAL MEDICINE
Payer: MEDICARE

## 2022-12-28 ENCOUNTER — OFFICE VISIT (OUTPATIENT)
Dept: INTERNAL MEDICINE | Facility: CLINIC | Age: 67
End: 2022-12-28
Payer: MEDICARE

## 2022-12-28 VITALS
HEART RATE: 85 BPM | OXYGEN SATURATION: 99 % | BODY MASS INDEX: 22.67 KG/M2 | DIASTOLIC BLOOD PRESSURE: 60 MMHG | WEIGHT: 112.44 LBS | SYSTOLIC BLOOD PRESSURE: 112 MMHG | HEIGHT: 59 IN

## 2022-12-28 DIAGNOSIS — D64.9 ANEMIA, UNSPECIFIED TYPE: ICD-10-CM

## 2022-12-28 DIAGNOSIS — E46 PROTEIN-CALORIE MALNUTRITION, UNSPECIFIED SEVERITY: ICD-10-CM

## 2022-12-28 DIAGNOSIS — I10 HYPERTENSION, ESSENTIAL: Primary | ICD-10-CM

## 2022-12-28 DIAGNOSIS — E11.9 TYPE 2 DIABETES MELLITUS WITHOUT COMPLICATION, WITHOUT LONG-TERM CURRENT USE OF INSULIN: ICD-10-CM

## 2022-12-28 DIAGNOSIS — N18.32 STAGE 3B CHRONIC KIDNEY DISEASE: ICD-10-CM

## 2022-12-28 DIAGNOSIS — M81.0 OSTEOPOROSIS, UNSPECIFIED OSTEOPOROSIS TYPE, UNSPECIFIED PATHOLOGICAL FRACTURE PRESENCE: ICD-10-CM

## 2022-12-28 DIAGNOSIS — E78.5 HYPERLIPIDEMIA, UNSPECIFIED HYPERLIPIDEMIA TYPE: ICD-10-CM

## 2022-12-28 DIAGNOSIS — Z29.9 PREVENTIVE MEASURE: ICD-10-CM

## 2022-12-28 DIAGNOSIS — E11.9 CONTROLLED TYPE 2 DIABETES MELLITUS WITHOUT COMPLICATION, WITHOUT LONG-TERM CURRENT USE OF INSULIN: ICD-10-CM

## 2022-12-28 LAB
ERYTHROCYTE [DISTWIDTH] IN BLOOD BY AUTOMATED COUNT: 12.6 % (ref 11.5–14.5)
FERRITIN SERPL-MCNC: 149 NG/ML (ref 20–300)
FOLATE SERPL-MCNC: 7.7 NG/ML (ref 4–24)
HCT VFR BLD AUTO: 34.4 % (ref 37–48.5)
HGB BLD-MCNC: 11.1 G/DL (ref 12–16)
IRON SERPL-MCNC: 56 UG/DL (ref 30–160)
MCH RBC QN AUTO: 29.4 PG (ref 27–31)
MCHC RBC AUTO-ENTMCNC: 32.3 G/DL (ref 32–36)
MCV RBC AUTO: 91 FL (ref 82–98)
PLATELET # BLD AUTO: 318 K/UL (ref 150–450)
PMV BLD AUTO: 9.4 FL (ref 9.2–12.9)
RBC # BLD AUTO: 3.77 M/UL (ref 4–5.4)
SATURATED IRON: 12 % (ref 20–50)
TOTAL IRON BINDING CAPACITY: 482 UG/DL (ref 250–450)
TRANSFERRIN SERPL-MCNC: 326 MG/DL (ref 200–375)
VIT B12 SERPL-MCNC: 366 PG/ML (ref 210–950)
WBC # BLD AUTO: 6.97 K/UL (ref 3.9–12.7)

## 2022-12-28 PROCEDURE — 99999 PR PBB SHADOW E&M-EST. PATIENT-LVL IV: CPT | Mod: PBBFAC,,, | Performed by: INTERNAL MEDICINE

## 2022-12-28 PROCEDURE — 99215 PR OFFICE/OUTPT VISIT, EST, LEVL V, 40-54 MIN: ICD-10-PCS | Mod: S$GLB,,, | Performed by: INTERNAL MEDICINE

## 2022-12-28 PROCEDURE — 3061F NEG MICROALBUMINURIA REV: CPT | Mod: CPTII,S$GLB,, | Performed by: INTERNAL MEDICINE

## 2022-12-28 PROCEDURE — 3288F PR FALLS RISK ASSESSMENT DOCUMENTED: ICD-10-PCS | Mod: CPTII,S$GLB,, | Performed by: INTERNAL MEDICINE

## 2022-12-28 PROCEDURE — 99215 OFFICE O/P EST HI 40 MIN: CPT | Mod: S$GLB,,, | Performed by: INTERNAL MEDICINE

## 2022-12-28 PROCEDURE — 36415 COLL VENOUS BLD VENIPUNCTURE: CPT | Performed by: INTERNAL MEDICINE

## 2022-12-28 PROCEDURE — 3044F HG A1C LEVEL LT 7.0%: CPT | Mod: CPTII,S$GLB,, | Performed by: INTERNAL MEDICINE

## 2022-12-28 PROCEDURE — 3061F PR NEG MICROALBUMINURIA RESULT DOCUMENTED/REVIEW: ICD-10-PCS | Mod: CPTII,S$GLB,, | Performed by: INTERNAL MEDICINE

## 2022-12-28 PROCEDURE — 1159F MED LIST DOCD IN RCRD: CPT | Mod: CPTII,S$GLB,, | Performed by: INTERNAL MEDICINE

## 2022-12-28 PROCEDURE — 3074F PR MOST RECENT SYSTOLIC BLOOD PRESSURE < 130 MM HG: ICD-10-PCS | Mod: CPTII,S$GLB,, | Performed by: INTERNAL MEDICINE

## 2022-12-28 PROCEDURE — 3078F PR MOST RECENT DIASTOLIC BLOOD PRESSURE < 80 MM HG: ICD-10-PCS | Mod: CPTII,S$GLB,, | Performed by: INTERNAL MEDICINE

## 2022-12-28 PROCEDURE — 82728 ASSAY OF FERRITIN: CPT | Performed by: INTERNAL MEDICINE

## 2022-12-28 PROCEDURE — 82607 VITAMIN B-12: CPT | Performed by: INTERNAL MEDICINE

## 2022-12-28 PROCEDURE — 1101F PT FALLS ASSESS-DOCD LE1/YR: CPT | Mod: CPTII,S$GLB,, | Performed by: INTERNAL MEDICINE

## 2022-12-28 PROCEDURE — 3066F NEPHROPATHY DOC TX: CPT | Mod: CPTII,S$GLB,, | Performed by: INTERNAL MEDICINE

## 2022-12-28 PROCEDURE — 3008F BODY MASS INDEX DOCD: CPT | Mod: CPTII,S$GLB,, | Performed by: INTERNAL MEDICINE

## 2022-12-28 PROCEDURE — 1101F PR PT FALLS ASSESS DOC 0-1 FALLS W/OUT INJ PAST YR: ICD-10-PCS | Mod: CPTII,S$GLB,, | Performed by: INTERNAL MEDICINE

## 2022-12-28 PROCEDURE — 82746 ASSAY OF FOLIC ACID SERUM: CPT | Performed by: INTERNAL MEDICINE

## 2022-12-28 PROCEDURE — 1126F AMNT PAIN NOTED NONE PRSNT: CPT | Mod: CPTII,S$GLB,, | Performed by: INTERNAL MEDICINE

## 2022-12-28 PROCEDURE — 99999 PR PBB SHADOW E&M-EST. PATIENT-LVL IV: ICD-10-PCS | Mod: PBBFAC,,, | Performed by: INTERNAL MEDICINE

## 2022-12-28 PROCEDURE — 3078F DIAST BP <80 MM HG: CPT | Mod: CPTII,S$GLB,, | Performed by: INTERNAL MEDICINE

## 2022-12-28 PROCEDURE — 1159F PR MEDICATION LIST DOCUMENTED IN MEDICAL RECORD: ICD-10-PCS | Mod: CPTII,S$GLB,, | Performed by: INTERNAL MEDICINE

## 2022-12-28 PROCEDURE — 3044F PR MOST RECENT HEMOGLOBIN A1C LEVEL <7.0%: ICD-10-PCS | Mod: CPTII,S$GLB,, | Performed by: INTERNAL MEDICINE

## 2022-12-28 PROCEDURE — 1126F PR PAIN SEVERITY QUANTIFIED, NO PAIN PRESENT: ICD-10-PCS | Mod: CPTII,S$GLB,, | Performed by: INTERNAL MEDICINE

## 2022-12-28 PROCEDURE — 84466 ASSAY OF TRANSFERRIN: CPT | Performed by: INTERNAL MEDICINE

## 2022-12-28 PROCEDURE — 3008F PR BODY MASS INDEX (BMI) DOCUMENTED: ICD-10-PCS | Mod: CPTII,S$GLB,, | Performed by: INTERNAL MEDICINE

## 2022-12-28 PROCEDURE — 85027 COMPLETE CBC AUTOMATED: CPT | Performed by: INTERNAL MEDICINE

## 2022-12-28 PROCEDURE — 3288F FALL RISK ASSESSMENT DOCD: CPT | Mod: CPTII,S$GLB,, | Performed by: INTERNAL MEDICINE

## 2022-12-28 PROCEDURE — 3066F PR DOCUMENTATION OF TREATMENT FOR NEPHROPATHY: ICD-10-PCS | Mod: CPTII,S$GLB,, | Performed by: INTERNAL MEDICINE

## 2022-12-28 PROCEDURE — 3074F SYST BP LT 130 MM HG: CPT | Mod: CPTII,S$GLB,, | Performed by: INTERNAL MEDICINE

## 2022-12-28 RX ORDER — METFORMIN HYDROCHLORIDE 500 MG/1
500 TABLET ORAL 2 TIMES DAILY WITH MEALS
Qty: 180 TABLET | Refills: 2 | Status: SHIPPED | OUTPATIENT
Start: 2022-12-28 | End: 2023-08-28

## 2022-12-28 RX ORDER — FENOFIBRATE 160 MG/1
160 TABLET ORAL DAILY
Qty: 90 TABLET | Refills: 2 | Status: SHIPPED | OUTPATIENT
Start: 2022-12-28 | End: 2023-08-28

## 2022-12-28 RX ORDER — SIMVASTATIN 40 MG/1
40 TABLET, FILM COATED ORAL NIGHTLY
Qty: 90 TABLET | Refills: 2 | Status: SHIPPED | OUTPATIENT
Start: 2022-12-28 | End: 2023-08-28

## 2022-12-28 RX ORDER — CARVEDILOL 6.25 MG/1
6.25 TABLET ORAL 2 TIMES DAILY WITH MEALS
Qty: 180 TABLET | Refills: 2 | Status: SHIPPED | OUTPATIENT
Start: 2022-12-28 | End: 2023-08-28

## 2022-12-28 NOTE — PROGRESS NOTES
Subjective:       Patient ID: Ebony Angel is a 67 y.o. female.    Chief Complaint:   Follow-up, Diabetes, and Hypertension    HPI:  Mrs Cooper presents to follow up the above  She is feeling ok/no complaints  She is looking forward to moving in to a new home in [place] which will be across the street from her daughter and close to many of her in-laws  HTN:Med TX 1-Avalide 300/12.5mg QD, 2-Coreg 6.25mg BID  DM: Med Tx 1- Glucophage 500mg BID,   HLD: Med Tx 1- Zocor 40mg QD, 2-Fenofibrate 160mg QD  Newly Dx'd Osteoporosis: Med TX 1- Boniva 150mg QMonth-Never started  Last Bone Density(10/2022)    Past Medical, Surgical, Social History: Please see as stated in Epic chart which has been reviewed.    Current Outpatient Medications   Medication Sig Dispense Refill    brimonidine 0.15 % OPTH DROP (ALPHAGAN) 0.15 % ophthalmic solution 1 drop 2 (two) times a day.      carvediloL (COREG) 6.25 MG tablet Take 1 tablet (6.25 mg total) by mouth 2 (two) times daily with meals. 180 tablet 0    fenofibrate 160 MG Tab Take 1 tablet (160 mg total) by mouth once daily. 90 tablet 2    ibandronate (BONIVA) 150 mg tablet Take 1 tablet (150 mg total) by mouth every 30 days. For Bones/Osteoporosis 1 tablet 4    irbesartan-hydrochlorothiazide (AVALIDE) 300-12.5 mg per tablet Take 1 tablet by mouth once daily. 90 tablet 0    latanoprost 0.005 % ophthalmic solution 1 drop once daily.      metFORMIN (GLUCOPHAGE) 500 MG tablet Take 1 tablet (500 mg total) by mouth 2 (two) times daily with meals. 180 tablet 0    simvastatin (ZOCOR) 40 MG tablet Take 1 tablet (40 mg total) by mouth every evening. 90 tablet 2     No current facility-administered medications for this visit.       Review of Systems   Constitutional:  Negative for activity change, fatigue and unexpected weight change.   HENT:  Negative for congestion, sinus pain and trouble swallowing.    Eyes:  Negative for visual disturbance.   Respiratory:  Negative for cough, chest  tightness, shortness of breath and wheezing.    Cardiovascular:  Negative for chest pain, palpitations and leg swelling.   Gastrointestinal:  Negative for abdominal pain, anal bleeding, blood in stool, constipation, diarrhea, nausea and vomiting.   Genitourinary:  Negative for dysuria, frequency, hematuria, urgency, vaginal bleeding and vaginal discharge.   Musculoskeletal:  Negative for arthralgias, back pain and neck pain.   Skin:  Negative for color change and rash.   Neurological:  Negative for dizziness, syncope, weakness, numbness and headaches.        No focal neurological abnormalities   Psychiatric/Behavioral:  Negative for sleep disturbance.         No depression/anxiety     Objective:      Lab Results   Component Value Date    WBC 5.24 12/20/2022    HGB 11.1 (L) 12/20/2022    HCT 33.8 (L) 12/20/2022     12/20/2022    CHOL 107 (L) 12/20/2022    TRIG 89 12/20/2022    HDL 40 12/20/2022    ALT 11 12/20/2022    AST 23 12/20/2022     (L) 12/20/2022    K 4.1 12/20/2022    CL 95 12/20/2022    CREATININE 1.4 12/20/2022    BUN 13 12/20/2022    CO2 22 (L) 12/20/2022    TSH 1.502 12/20/2022    HGBA1C 6.1 (H) 12/20/2022     Physical Exam  Vitals reviewed.   Constitutional:       Appearance: Normal appearance.   HENT:      Head: Normocephalic and atraumatic.      Mouth/Throat:      Mouth: Mucous membranes are dry.      Pharynx: No posterior oropharyngeal erythema.   Cardiovascular:      Rate and Rhythm: Normal rate and regular rhythm.   Pulmonary:      Effort: Pulmonary effort is normal.      Breath sounds: Normal breath sounds. No wheezing.   Chest:      Chest wall: No tenderness.   Abdominal:      General: Abdomen is flat. Bowel sounds are normal.      Palpations: Abdomen is soft. There is no mass.      Tenderness: There is no abdominal tenderness.   Musculoskeletal:         General: No swelling.      Cervical back: Normal range of motion and neck supple. No muscular tenderness.      Right lower leg: No  "edema.      Left lower leg: No edema.   Lymphadenopathy:      Cervical: No cervical adenopathy.   Skin:     General: Skin is warm and dry.   Neurological:      General: No focal deficit present.      Mental Status: She is alert.     Breasts:  On gross visualization, normal; on palpation no masses tenderness or nipple discharge    Vital Signs  Pulse: 85  SpO2: 99 %  BP: 112/60  BP Location: Left arm  Patient Position: Sitting  Pain Score: 0-No pain  Height and Weight  Height: 4' 11" (149.9 cm)  Weight: 51 kg (112 lb 7 oz)  BSA (Calculated - sq m): 1.46 sq meters  BMI (Calculated): 22.7  Weight in (lb) to have BMI = 25: 123.5    Assessment:       1. Hypertension, essential    2. Controlled type 2 diabetes mellitus without complication, without long-term current use of insulin    3. Hyperlipidemia, unspecified hyperlipidemia type    4. Stage 3b chronic kidney disease    5. Anemia, unspecified type    6. Protein-calorie malnutrition, unspecified severity    7. Osteoporosis, unspecified osteoporosis type, unspecified pathological fracture presence    8. Preventive measure        Plan:     Health Maintenance   Topic Date Due    Foot Exam  Never done    Eye Exam  03/23/2023    Hemoglobin A1c  06/20/2023    Low Dose Statin  07/26/2023    Mammogram  09/21/2023    Lipid Panel  12/20/2023    DEXA Scan  09/21/2024    TETANUS VACCINE  07/26/2032    Hepatitis C Screening  Completed        Ebony was seen today for follow-up, diabetes and hypertension.    Diagnoses and all orders for this visit:    Hypertension, essential/controlled        -     Continue: :Med TX 1-Avalide 300/12.5mg QD, 2-Coreg 6.25mg BID  -     Comprehensive Metabolic Panel; Future  -     CBC Auto Differential; Future    Controlled type 2 diabetes mellitus without complication, without long-term current use of insulin        -     Continue: Med Tx 1- Glucophage 500mg BID,   -     Hemoglobin A1C; Future    Hyperlipidemia, unspecified hyperlipidemia " type/controlled        -     Continue: Med Tx 1- Zocor 40mg QD, 2-Fenofibrate 160mg QD  -     Comprehensive Metabolic Panel; Future  -     Lipid Panel; Future    Stage 3b chronic kidney disease/discussed with pt at length        -     No NSAIDS  -     Comprehensive Metabolic Panel; Future    Anemia, unspecified type  -     CBC Without Differential; Standing  -     Ferritin; Future  -     Vitamin B12; Future  -     Iron and TIBC; Future  -     FOLATE; Future  -     Fecal Immunochemical Test (iFOBT); Future  -     CBC Auto Differential; Future    Protein-calorie malnutrition, unspecified severity  -     Vitamin B12; Future  -     FOLATE; Future    Osteoporosis, unspecified osteoporosis type, unspecified pathological fracture presence/in pt with Hx CKD St [address]  -     Ambulatory referral/consult to Endocrinology; Future    Preventive measure  -     Ambulatory referral/consult to Gynecology; Future    Health maintenance/no colonoscopy to date        -     ' FIT kit today and have asked patient to consider colonoscopy in the future        -     return to clinic x6 months with 1 week prior fasting lab or see patient sooner if needed

## 2022-12-29 PROBLEM — E11.9 TYPE 2 DIABETES MELLITUS WITHOUT COMPLICATION, WITHOUT LONG-TERM CURRENT USE OF INSULIN: Status: ACTIVE | Noted: 2022-12-29

## 2022-12-29 PROBLEM — N18.32 STAGE 3B CHRONIC KIDNEY DISEASE: Status: ACTIVE | Noted: 2022-12-29

## 2023-01-13 ENCOUNTER — TELEPHONE (OUTPATIENT)
Dept: INTERNAL MEDICINE | Facility: CLINIC | Age: 68
End: 2023-01-13
Payer: MEDICARE

## 2023-01-13 DIAGNOSIS — D50.9 IRON DEFICIENCY ANEMIA, UNSPECIFIED IRON DEFICIENCY ANEMIA TYPE: Primary | ICD-10-CM

## 2023-01-13 NOTE — TELEPHONE ENCOUNTER
Hien, would you please call Allison and let her know that her Lab(12/28) showed her TIBC/iron stores to be low with rest lab ok. I would like her to start an iron tab(Slow  Fe) at 1 tab daily. I will send it to her pharmacy so they will pull it for her.  Please remind her to send in her FIT kit also.  Thanks

## 2023-06-15 ENCOUNTER — PATIENT OUTREACH (OUTPATIENT)
Dept: ADMINISTRATIVE | Facility: HOSPITAL | Age: 68
End: 2023-06-15
Payer: MEDICARE

## 2023-06-15 DIAGNOSIS — E11.9 TYPE 2 DIABETES MELLITUS WITHOUT COMPLICATION, WITHOUT LONG-TERM CURRENT USE OF INSULIN: Primary | ICD-10-CM

## 2023-06-15 NOTE — PROGRESS NOTES
Health Maintenance Due   Topic Date Due    Foot Exam  Never done    Colorectal Cancer Screening  Never done    Shingles Vaccine (1 of 2) Never done    COVID-19 Vaccine (3 - Pfizer series) 11/29/2021    Eye Exam  03/23/2023     Chart reviewed.   Immunizations: Reconciled  Orders placed: N/A  Upcoming appts to satisfy URMILA topics: Labs 6/21/2023

## 2023-06-23 ENCOUNTER — LAB VISIT (OUTPATIENT)
Dept: LAB | Facility: HOSPITAL | Age: 68
End: 2023-06-23
Attending: INTERNAL MEDICINE
Payer: MEDICARE

## 2023-06-23 DIAGNOSIS — E11.9 TYPE 2 DIABETES MELLITUS WITHOUT COMPLICATION, WITHOUT LONG-TERM CURRENT USE OF INSULIN: ICD-10-CM

## 2023-06-23 LAB
ALBUMIN/CREAT UR: 12.8 UG/MG (ref 0–30)
CREAT UR-MCNC: 94 MG/DL (ref 15–325)
MICROALBUMIN UR DL<=1MG/L-MCNC: 12 UG/ML

## 2023-06-23 PROCEDURE — 82570 ASSAY OF URINE CREATININE: CPT | Performed by: INTERNAL MEDICINE

## 2023-06-28 ENCOUNTER — OFFICE VISIT (OUTPATIENT)
Dept: INTERNAL MEDICINE | Facility: CLINIC | Age: 68
End: 2023-06-28
Payer: MEDICARE

## 2023-06-28 DIAGNOSIS — N18.32 TYPE 2 DIABETES MELLITUS WITH STAGE 3B CHRONIC KIDNEY DISEASE, WITHOUT LONG-TERM CURRENT USE OF INSULIN: Primary | ICD-10-CM

## 2023-06-28 DIAGNOSIS — I10 HYPERTENSION, ESSENTIAL: ICD-10-CM

## 2023-06-28 DIAGNOSIS — D64.9 ANEMIA, UNSPECIFIED TYPE: ICD-10-CM

## 2023-06-28 DIAGNOSIS — M81.0 OSTEOPOROSIS, UNSPECIFIED OSTEOPOROSIS TYPE, UNSPECIFIED PATHOLOGICAL FRACTURE PRESENCE: ICD-10-CM

## 2023-06-28 DIAGNOSIS — N18.32 STAGE 3B CHRONIC KIDNEY DISEASE: ICD-10-CM

## 2023-06-28 DIAGNOSIS — E11.22 TYPE 2 DIABETES MELLITUS WITH STAGE 3B CHRONIC KIDNEY DISEASE, WITHOUT LONG-TERM CURRENT USE OF INSULIN: Primary | ICD-10-CM

## 2023-06-28 DIAGNOSIS — M81.0 AGE-RELATED OSTEOPOROSIS WITHOUT CURRENT PATHOLOGICAL FRACTURE: ICD-10-CM

## 2023-06-28 DIAGNOSIS — Z12.31 ENCOUNTER FOR SCREENING MAMMOGRAM FOR MALIGNANT NEOPLASM OF BREAST: ICD-10-CM

## 2023-06-28 DIAGNOSIS — E78.5 HYPERLIPIDEMIA, UNSPECIFIED HYPERLIPIDEMIA TYPE: ICD-10-CM

## 2023-06-28 DIAGNOSIS — Z12.11 COLON CANCER SCREENING: ICD-10-CM

## 2023-06-28 PROBLEM — N18.30 DIABETES MELLITUS WITH STAGE 3 CHRONIC KIDNEY DISEASE: Status: ACTIVE | Noted: 2022-12-29

## 2023-06-28 PROCEDURE — 99215 PR OFFICE/OUTPT VISIT, EST, LEVL V, 40-54 MIN: ICD-10-PCS | Mod: S$GLB,,, | Performed by: INTERNAL MEDICINE

## 2023-06-28 PROCEDURE — 3078F PR MOST RECENT DIASTOLIC BLOOD PRESSURE < 80 MM HG: ICD-10-PCS | Mod: CPTII,S$GLB,, | Performed by: INTERNAL MEDICINE

## 2023-06-28 PROCEDURE — 3288F PR FALLS RISK ASSESSMENT DOCUMENTED: ICD-10-PCS | Mod: CPTII,S$GLB,, | Performed by: INTERNAL MEDICINE

## 2023-06-28 PROCEDURE — 1126F AMNT PAIN NOTED NONE PRSNT: CPT | Mod: CPTII,S$GLB,, | Performed by: INTERNAL MEDICINE

## 2023-06-28 PROCEDURE — 99215 OFFICE O/P EST HI 40 MIN: CPT | Mod: S$GLB,,, | Performed by: INTERNAL MEDICINE

## 2023-06-28 PROCEDURE — 1101F PR PT FALLS ASSESS DOC 0-1 FALLS W/OUT INJ PAST YR: ICD-10-PCS | Mod: CPTII,S$GLB,, | Performed by: INTERNAL MEDICINE

## 2023-06-28 PROCEDURE — 3008F BODY MASS INDEX DOCD: CPT | Mod: CPTII,S$GLB,, | Performed by: INTERNAL MEDICINE

## 2023-06-28 PROCEDURE — 1126F PR PAIN SEVERITY QUANTIFIED, NO PAIN PRESENT: ICD-10-PCS | Mod: CPTII,S$GLB,, | Performed by: INTERNAL MEDICINE

## 2023-06-28 PROCEDURE — 99999 PR PBB SHADOW E&M-EST. PATIENT-LVL IV: CPT | Mod: PBBFAC,,, | Performed by: INTERNAL MEDICINE

## 2023-06-28 PROCEDURE — 1101F PT FALLS ASSESS-DOCD LE1/YR: CPT | Mod: CPTII,S$GLB,, | Performed by: INTERNAL MEDICINE

## 2023-06-28 PROCEDURE — 3066F PR DOCUMENTATION OF TREATMENT FOR NEPHROPATHY: ICD-10-PCS | Mod: CPTII,S$GLB,, | Performed by: INTERNAL MEDICINE

## 2023-06-28 PROCEDURE — 3074F PR MOST RECENT SYSTOLIC BLOOD PRESSURE < 130 MM HG: ICD-10-PCS | Mod: CPTII,S$GLB,, | Performed by: INTERNAL MEDICINE

## 2023-06-28 PROCEDURE — 1159F PR MEDICATION LIST DOCUMENTED IN MEDICAL RECORD: ICD-10-PCS | Mod: CPTII,S$GLB,, | Performed by: INTERNAL MEDICINE

## 2023-06-28 PROCEDURE — 3061F PR NEG MICROALBUMINURIA RESULT DOCUMENTED/REVIEW: ICD-10-PCS | Mod: CPTII,S$GLB,, | Performed by: INTERNAL MEDICINE

## 2023-06-28 PROCEDURE — 3044F HG A1C LEVEL LT 7.0%: CPT | Mod: CPTII,S$GLB,, | Performed by: INTERNAL MEDICINE

## 2023-06-28 PROCEDURE — 99999 PR PBB SHADOW E&M-EST. PATIENT-LVL IV: ICD-10-PCS | Mod: PBBFAC,,, | Performed by: INTERNAL MEDICINE

## 2023-06-28 PROCEDURE — 3061F NEG MICROALBUMINURIA REV: CPT | Mod: CPTII,S$GLB,, | Performed by: INTERNAL MEDICINE

## 2023-06-28 PROCEDURE — 3066F NEPHROPATHY DOC TX: CPT | Mod: CPTII,S$GLB,, | Performed by: INTERNAL MEDICINE

## 2023-06-28 PROCEDURE — 3288F FALL RISK ASSESSMENT DOCD: CPT | Mod: CPTII,S$GLB,, | Performed by: INTERNAL MEDICINE

## 2023-06-28 PROCEDURE — 1159F MED LIST DOCD IN RCRD: CPT | Mod: CPTII,S$GLB,, | Performed by: INTERNAL MEDICINE

## 2023-06-28 PROCEDURE — 3074F SYST BP LT 130 MM HG: CPT | Mod: CPTII,S$GLB,, | Performed by: INTERNAL MEDICINE

## 2023-06-28 PROCEDURE — 3044F PR MOST RECENT HEMOGLOBIN A1C LEVEL <7.0%: ICD-10-PCS | Mod: CPTII,S$GLB,, | Performed by: INTERNAL MEDICINE

## 2023-06-28 PROCEDURE — 3008F PR BODY MASS INDEX (BMI) DOCUMENTED: ICD-10-PCS | Mod: CPTII,S$GLB,, | Performed by: INTERNAL MEDICINE

## 2023-06-28 PROCEDURE — 3078F DIAST BP <80 MM HG: CPT | Mod: CPTII,S$GLB,, | Performed by: INTERNAL MEDICINE

## 2023-06-28 NOTE — PROGRESS NOTES
Subjective:       Patient ID: Ebony Angel is a 68 y.o. female.    Chief Complaint:   Follow-up, Hypertension, Diabetes, and Hyperlipidemia    HPI: Mrs Cooper presents for follow up the above  She has been working on her new house which they are building in [place].  She has been drinking water but the heat is severe  HTN:Med TX 1-Avalide 300/12.5mg QD, 2-Coreg 6.25mg BID  DM: Med Tx 1- Glucophage 500mg BID        Accuchecks:  Run around 100  HLD: Med Tx 1- Zocor 40mg QD, 2-Fenofibrate 160mg QD  Newly Dx'd Osteoporosis: Med TX 1- None/Concerned with CKD ; No Endocrine Appt Yet/ Last Bone Density(10/2022)    Past Medical, Surgical, Social History: Please see as stated in Epic chart which has been reviewed.    Current Outpatient Medications   Medication Sig Dispense Refill    brimonidine 0.15 % OPTH DROP (ALPHAGAN) 0.15 % ophthalmic solution 1 drop 2 (two) times a day.      carvediloL (COREG) 6.25 MG tablet Take 1 tablet (6.25 mg total) by mouth 2 (two) times daily with meals. 180 tablet 2    fenofibrate 160 MG Tab Take 1 tablet (160 mg total) by mouth once daily. 90 tablet 2    ferrous sulfate, dried (SLOW FE) 160 mg (50 mg iron) TbSR Take 1 tablet (160 mg total) by mouth once daily. 90 tablet 2    irbesartan-hydrochlorothiazide (AVALIDE) 300-12.5 mg per tablet TAKE 1 TABLET BY MOUTH EVERY DAY 90 tablet 2    latanoprost 0.005 % ophthalmic solution 1 drop once daily.      metFORMIN (GLUCOPHAGE) 500 MG tablet Take 1 tablet (500 mg total) by mouth 2 (two) times daily with meals. 180 tablet 2    simvastatin (ZOCOR) 40 MG tablet Take 1 tablet (40 mg total) by mouth every evening. 90 tablet 2     No current facility-administered medications for this visit.       Review of Systems   HENT:  Positive for congestion.         + Nocturnal congestion Left nostril   Respiratory: Negative.     Cardiovascular: Negative.    Gastrointestinal: Negative.    Genitourinary: Negative.    Neurological:  Positive for headaches.     "    + From working outside in heat-no focal neurological abnormalities      Objective:      Lab Results   Component Value Date    WBC 7.53 06/23/2023    HGB 11.1 (L) 06/23/2023    HCT 33.7 (L) 06/23/2023     06/23/2023    CHOL 112 (L) 06/23/2023    TRIG 76 06/23/2023    HDL 40 06/23/2023    ALT 11 06/23/2023    AST 19 06/23/2023     (L) 06/23/2023    K 4.2 06/23/2023    CL 99 06/23/2023    CREATININE 1.5 (H) 06/23/2023    BUN 22 06/23/2023    CO2 20 (L) 06/23/2023    TSH 1.502 12/20/2022    HGBA1C 6.5 (H) 06/23/2023     Physical Exam  Vitals reviewed.   Constitutional:       Appearance: Normal appearance.   HENT:      Head: Normocephalic and atraumatic.      Mouth/Throat:      Mouth: Mucous membranes are dry.      Pharynx: No posterior oropharyngeal erythema.   Cardiovascular:      Rate and Rhythm: Normal rate and regular rhythm.   Pulmonary:      Effort: Pulmonary effort is normal.      Breath sounds: Normal breath sounds. No wheezing.   Chest:      Chest wall: No tenderness.   Abdominal:      General: Abdomen is flat. Bowel sounds are normal.      Palpations: Abdomen is soft. There is no mass.      Tenderness: There is no abdominal tenderness.   Musculoskeletal:         General: Deformity present. No swelling.      Cervical back: Normal range of motion and neck supple. No muscular tenderness.      Right lower leg: No edema.      Left lower leg: No edema.      Comments: + Haberdens nodules of bilateral DIP/PIP joints, but good ROM and no acute warmth   Lymphadenopathy:      Cervical: No cervical adenopathy.   Skin:     General: Skin is warm and dry.   Neurological:      General: No focal deficit present.      Mental Status: She is alert.         Vital Signs  Pulse: 91  SpO2: 99 %  BP: 120/66  BP Location: Left arm  Patient Position: Sitting  Pain Score: 0-No pain  Height and Weight  Height: 4' 11" (149.9 cm)  Weight: 52 kg (114 lb 10.2 oz)  BSA (Calculated - sq m): 1.47 sq meters  BMI (Calculated): " 23.1  Weight in (lb) to have BMI = 25: 123.5    Protective Sensation (w/ 10 gram monofilament):  Right: Intact  Left: Intact    Visual Inspection:  Normal -  Bilateral    Pedal Pulses:   Right: Present  Left: Present    Posterior Tibialis Pulses:   Right:Present  Left: Present     Assessment:       1. Type 2 diabetes mellitus with stage 3b chronic kidney disease, without long-term current use of insulin    2. Hypertension, essential    3. Hyperlipidemia, unspecified hyperlipidemia type    4. Anemia, unspecified type    5. Stage 3b chronic kidney disease    6. Osteoporosis, unspecified osteoporosis type, unspecified pathological fracture presence    7. Colon cancer screening    8. Encounter for screening mammogram for malignant neoplasm of breast        Plan:     Health Maintenance   Topic Date Due    Eye Exam  03/23/2023    Mammogram  09/21/2023    Hemoglobin A1c  12/23/2023    Low Dose Statin  12/28/2023    Lipid Panel  06/23/2024    Foot Exam  06/28/2024    DEXA Scan  09/21/2024    TETANUS VACCINE  07/26/2032    Hepatitis C Screening  Completed        Ebony was seen today for follow-up, hypertension, diabetes and hyperlipidemia.    Diagnoses and all orders for this visit:    Type 2 diabetes mellitus with stage 3b chronic kidney disease, without long-term current use of insulin/controlled on Glucophage 500 mg b.i.d.  -     Comprehensive Metabolic Panel; Future  -     Hemoglobin A1C; Future    Hypertension, essential/acceptable control        -     Continue: Med TX 1-Avalide 300/12.5mg QD, 2-Coreg 6.25mg BID  -     Comprehensive Metabolic Panel; Future  -     CBC Auto Differential; Future    Hyperlipidemia, unspecified hyperlipidemia type/controlled        -     Continue: Med Tx 1- Zocor 40mg QD, 2-Fenofibrate 160mg QD  -     Comprehensive Metabolic Panel; Future  -     Lipid Panel; Future    Anemia, unspecified type        -     patient to start iron tabs once daily  -     Fecal Immunochemical Test (iFOBT);  Future  -     CBC Auto Differential; Future    Stage 3b chronic kidney disease        -     No NSAIDS  -     Basic Metabolic Panel; Future  -     Comprehensive Metabolic Panel; Future    Osteoporosis, unspecified osteoporosis type, unspecified pathological fracture presence/hesitant to start bisphosphonate with CKD        -     schedule appointment in Endocrinology    Colon cancer screening/no colonoscopy to date with patient deferring on such  -     Fecal Immunochemical Test (iFOBT); Future    Encounter for screening mammogram for malignant neoplasm of breast  -     Mammo Digital Screening Bilat w/ Lang; Future    Health maintenance        -     return to clinic x5 months with 1 week prior fasting lab her see patient sooner if needed

## 2023-06-28 NOTE — PATIENT INSTRUCTIONS
For Anemia:  Either Fergon 325mg or Slow Iron 50-60mg at 1/day    For Kidneys/Electrolytes:  Drink 2 large(16 oz) bottles water daily and a small bottle of Power-Aide or Gator-Aide daily    For Arthritis/Headaches:  #1-Tylenol as needed-Dont take Advil,Motrin, Aleve etc

## 2023-06-29 VITALS
BODY MASS INDEX: 23.11 KG/M2 | OXYGEN SATURATION: 99 % | HEIGHT: 59 IN | SYSTOLIC BLOOD PRESSURE: 120 MMHG | WEIGHT: 114.63 LBS | DIASTOLIC BLOOD PRESSURE: 66 MMHG | HEART RATE: 91 BPM

## 2023-06-29 PROBLEM — M81.0 AGE-RELATED OSTEOPOROSIS WITHOUT CURRENT PATHOLOGICAL FRACTURE: Status: ACTIVE | Noted: 2023-06-29

## 2023-07-19 ENCOUNTER — LAB VISIT (OUTPATIENT)
Dept: LAB | Facility: HOSPITAL | Age: 68
End: 2023-07-19
Attending: INTERNAL MEDICINE
Payer: MEDICARE

## 2023-07-19 DIAGNOSIS — N18.32 STAGE 3B CHRONIC KIDNEY DISEASE: ICD-10-CM

## 2023-07-19 LAB
ANION GAP SERPL CALC-SCNC: 10 MMOL/L (ref 8–16)
BUN SERPL-MCNC: 27 MG/DL (ref 8–23)
CALCIUM SERPL-MCNC: 9.9 MG/DL (ref 8.7–10.5)
CHLORIDE SERPL-SCNC: 98 MMOL/L (ref 95–110)
CO2 SERPL-SCNC: 22 MMOL/L (ref 23–29)
CREAT SERPL-MCNC: 1.6 MG/DL (ref 0.5–1.4)
EST. GFR  (NO RACE VARIABLE): 34.9 ML/MIN/1.73 M^2
GLUCOSE SERPL-MCNC: 147 MG/DL (ref 70–110)
POTASSIUM SERPL-SCNC: 4.3 MMOL/L (ref 3.5–5.1)
SODIUM SERPL-SCNC: 130 MMOL/L (ref 136–145)

## 2023-07-19 PROCEDURE — 80048 BASIC METABOLIC PNL TOTAL CA: CPT | Performed by: INTERNAL MEDICINE

## 2023-07-19 PROCEDURE — 36415 COLL VENOUS BLD VENIPUNCTURE: CPT | Performed by: INTERNAL MEDICINE

## 2023-07-21 ENCOUNTER — HOSPITAL ENCOUNTER (OUTPATIENT)
Dept: RADIOLOGY | Facility: HOSPITAL | Age: 68
Discharge: HOME | End: 2023-07-21
Attending: INTERNAL MEDICINE
Payer: MEDICARE

## 2023-07-21 DIAGNOSIS — Z12.31 ENCOUNTER FOR SCREENING MAMMOGRAM FOR MALIGNANT NEOPLASM OF BREAST: ICD-10-CM

## 2023-07-21 PROCEDURE — 77067 SCR MAMMO BI INCL CAD: CPT | Mod: TC

## 2023-07-21 PROCEDURE — 77067 SCR MAMMO BI INCL CAD: CPT | Mod: 26,,, | Performed by: RADIOLOGY

## 2023-07-21 PROCEDURE — 77067 MAMMO DIGITAL SCREENING BILAT WITH TOMO: ICD-10-PCS | Mod: 26,,, | Performed by: RADIOLOGY

## 2023-07-21 PROCEDURE — 77063 BREAST TOMOSYNTHESIS BI: CPT | Mod: 26,,, | Performed by: RADIOLOGY

## 2023-07-21 PROCEDURE — 77063 MAMMO DIGITAL SCREENING BILAT WITH TOMO: ICD-10-PCS | Mod: 26,,, | Performed by: RADIOLOGY

## 2023-07-24 ENCOUNTER — TELEPHONE (OUTPATIENT)
Dept: INTERNAL MEDICINE | Facility: CLINIC | Age: 68
End: 2023-07-24
Payer: MEDICARE

## 2023-07-24 DIAGNOSIS — I10 HYPERTENSION, ESSENTIAL: Primary | ICD-10-CM

## 2023-07-24 RX ORDER — IRBESARTAN 300 MG/1
300 TABLET ORAL NIGHTLY
Qty: 90 TABLET | Refills: 1 | Status: SHIPPED | OUTPATIENT
Start: 2023-07-24 | End: 2023-07-24 | Stop reason: SDUPTHER

## 2023-07-24 RX ORDER — IRBESARTAN 300 MG/1
300 TABLET ORAL NIGHTLY
Qty: 90 TABLET | Refills: 1 | Status: SHIPPED | OUTPATIENT
Start: 2023-07-24 | End: 2023-11-28 | Stop reason: SDUPTHER

## 2023-08-28 DIAGNOSIS — E78.5 HYPERLIPIDEMIA, UNSPECIFIED HYPERLIPIDEMIA TYPE: ICD-10-CM

## 2023-08-28 DIAGNOSIS — E11.9 CONTROLLED TYPE 2 DIABETES MELLITUS WITHOUT COMPLICATION, WITHOUT LONG-TERM CURRENT USE OF INSULIN: ICD-10-CM

## 2023-08-28 DIAGNOSIS — I10 HYPERTENSION, ESSENTIAL: ICD-10-CM

## 2023-08-28 RX ORDER — FENOFIBRATE 160 MG/1
160 TABLET ORAL
Qty: 90 TABLET | Refills: 2 | Status: SHIPPED | OUTPATIENT
Start: 2023-08-28

## 2023-08-28 RX ORDER — METFORMIN HYDROCHLORIDE 500 MG/1
500 TABLET ORAL 2 TIMES DAILY WITH MEALS
Qty: 180 TABLET | Refills: 2 | Status: SHIPPED | OUTPATIENT
Start: 2023-08-28

## 2023-08-28 RX ORDER — SIMVASTATIN 40 MG/1
TABLET, FILM COATED ORAL
Qty: 90 TABLET | Refills: 2 | Status: SHIPPED | OUTPATIENT
Start: 2023-08-28

## 2023-08-28 RX ORDER — CARVEDILOL 6.25 MG/1
6.25 TABLET ORAL 2 TIMES DAILY WITH MEALS
Qty: 180 TABLET | Refills: 2 | Status: SHIPPED | OUTPATIENT
Start: 2023-08-28

## 2023-08-28 RX ORDER — IRBESARTAN AND HYDROCHLOROTHIAZIDE 300; 12.5 MG/1; MG/1
1 TABLET, FILM COATED ORAL
Qty: 90 TABLET | Refills: 2 | Status: SHIPPED | OUTPATIENT
Start: 2023-08-28 | End: 2023-11-28

## 2023-08-29 ENCOUNTER — OFFICE VISIT (OUTPATIENT)
Dept: ENDOCRINOLOGY | Facility: CLINIC | Age: 68
End: 2023-08-29
Payer: MEDICARE

## 2023-08-29 VITALS
TEMPERATURE: 98 F | HEART RATE: 82 BPM | WEIGHT: 115.81 LBS | SYSTOLIC BLOOD PRESSURE: 145 MMHG | DIASTOLIC BLOOD PRESSURE: 88 MMHG | HEIGHT: 59 IN | BODY MASS INDEX: 23.35 KG/M2

## 2023-08-29 DIAGNOSIS — M81.0 AGE-RELATED OSTEOPOROSIS WITHOUT CURRENT PATHOLOGICAL FRACTURE: Primary | ICD-10-CM

## 2023-08-29 DIAGNOSIS — M81.0 OSTEOPOROSIS, UNSPECIFIED OSTEOPOROSIS TYPE, UNSPECIFIED PATHOLOGICAL FRACTURE PRESENCE: ICD-10-CM

## 2023-08-29 DIAGNOSIS — N18.32 STAGE 3B CHRONIC KIDNEY DISEASE: ICD-10-CM

## 2023-08-29 PROCEDURE — 99204 OFFICE O/P NEW MOD 45 MIN: CPT | Mod: S$GLB,,, | Performed by: HOSPITALIST

## 2023-08-29 PROCEDURE — 3288F FALL RISK ASSESSMENT DOCD: CPT | Mod: CPTII,S$GLB,, | Performed by: HOSPITALIST

## 2023-08-29 PROCEDURE — 99999 PR PBB SHADOW E&M-EST. PATIENT-LVL III: CPT | Mod: PBBFAC,,, | Performed by: HOSPITALIST

## 2023-08-29 PROCEDURE — 3061F PR NEG MICROALBUMINURIA RESULT DOCUMENTED/REVIEW: ICD-10-PCS | Mod: CPTII,S$GLB,, | Performed by: HOSPITALIST

## 2023-08-29 PROCEDURE — 3044F HG A1C LEVEL LT 7.0%: CPT | Mod: CPTII,S$GLB,, | Performed by: HOSPITALIST

## 2023-08-29 PROCEDURE — 1159F PR MEDICATION LIST DOCUMENTED IN MEDICAL RECORD: ICD-10-PCS | Mod: CPTII,S$GLB,, | Performed by: HOSPITALIST

## 2023-08-29 PROCEDURE — 3077F SYST BP >= 140 MM HG: CPT | Mod: CPTII,S$GLB,, | Performed by: HOSPITALIST

## 2023-08-29 PROCEDURE — 3061F NEG MICROALBUMINURIA REV: CPT | Mod: CPTII,S$GLB,, | Performed by: HOSPITALIST

## 2023-08-29 PROCEDURE — 1101F PR PT FALLS ASSESS DOC 0-1 FALLS W/OUT INJ PAST YR: ICD-10-PCS | Mod: CPTII,S$GLB,, | Performed by: HOSPITALIST

## 2023-08-29 PROCEDURE — 1101F PT FALLS ASSESS-DOCD LE1/YR: CPT | Mod: CPTII,S$GLB,, | Performed by: HOSPITALIST

## 2023-08-29 PROCEDURE — 3079F DIAST BP 80-89 MM HG: CPT | Mod: CPTII,S$GLB,, | Performed by: HOSPITALIST

## 2023-08-29 PROCEDURE — 3066F NEPHROPATHY DOC TX: CPT | Mod: CPTII,S$GLB,, | Performed by: HOSPITALIST

## 2023-08-29 PROCEDURE — 3288F PR FALLS RISK ASSESSMENT DOCUMENTED: ICD-10-PCS | Mod: CPTII,S$GLB,, | Performed by: HOSPITALIST

## 2023-08-29 PROCEDURE — 3008F PR BODY MASS INDEX (BMI) DOCUMENTED: ICD-10-PCS | Mod: CPTII,S$GLB,, | Performed by: HOSPITALIST

## 2023-08-29 PROCEDURE — 99999 PR PBB SHADOW E&M-EST. PATIENT-LVL III: ICD-10-PCS | Mod: PBBFAC,,, | Performed by: HOSPITALIST

## 2023-08-29 PROCEDURE — 3079F PR MOST RECENT DIASTOLIC BLOOD PRESSURE 80-89 MM HG: ICD-10-PCS | Mod: CPTII,S$GLB,, | Performed by: HOSPITALIST

## 2023-08-29 PROCEDURE — 1159F MED LIST DOCD IN RCRD: CPT | Mod: CPTII,S$GLB,, | Performed by: HOSPITALIST

## 2023-08-29 PROCEDURE — 4010F ACE/ARB THERAPY RXD/TAKEN: CPT | Mod: CPTII,S$GLB,, | Performed by: HOSPITALIST

## 2023-08-29 PROCEDURE — 3077F PR MOST RECENT SYSTOLIC BLOOD PRESSURE >= 140 MM HG: ICD-10-PCS | Mod: CPTII,S$GLB,, | Performed by: HOSPITALIST

## 2023-08-29 PROCEDURE — 99204 PR OFFICE/OUTPT VISIT, NEW, LEVL IV, 45-59 MIN: ICD-10-PCS | Mod: S$GLB,,, | Performed by: HOSPITALIST

## 2023-08-29 PROCEDURE — 3044F PR MOST RECENT HEMOGLOBIN A1C LEVEL <7.0%: ICD-10-PCS | Mod: CPTII,S$GLB,, | Performed by: HOSPITALIST

## 2023-08-29 PROCEDURE — 3008F BODY MASS INDEX DOCD: CPT | Mod: CPTII,S$GLB,, | Performed by: HOSPITALIST

## 2023-08-29 PROCEDURE — 3066F PR DOCUMENTATION OF TREATMENT FOR NEPHROPATHY: ICD-10-PCS | Mod: CPTII,S$GLB,, | Performed by: HOSPITALIST

## 2023-08-29 PROCEDURE — 4010F PR ACE/ARB THEARPY RXD/TAKEN: ICD-10-PCS | Mod: CPTII,S$GLB,, | Performed by: HOSPITALIST

## 2023-08-29 NOTE — ASSESSMENT & PLAN NOTE
- Patient with Osteoporosis as been treated in the past  - Discussed with pt diagnosis of osteoporosis.  Bone density review with patient and discussed with patient in clinic.   - previously patient was on Fosamax, duration of therapy unknown  - Risk factors include:  Postmenopausal, CKD stage 3B  - High fall risk, CKD stage3, not candidate for oral bisphosphonate    Plan  - Treatment options and potential side effects discussed for  SC Denosumab every 6 months  - After long discussion with patient, we agreed to start patient on: SC Denosumab  - Recommend the patient take sufficient calcium (1000 mg/day in divided doses) and vitamin D3 (2000 units a day), written instruction given  - Routine lab work>> 6 months: check CMP, vitamin-D  - Next DXA: 2 years from most current, 2025  - Fall precautions/Exercise regimen: advised, (weight bearing exercises: handout given)  - Routine dental health screening advised, cleaning every 6 months     Duration of therapy   SC Prolia: monitor after 2 years of treatment with DXA,  can continue indefinitely    Risk of osteonecrosis of the jaw (DISCUSSED) with bisphosphonates and denosumab, with incidence estimated from 1-10/262010 treated patients. The incidence of ONJ is higher in patients undergoing invasive dental surgery (excludes routine cleaning, fillings or root canals).   Dental health: Advised dental work should ideally be completed prior to initiation of treatment, follow up with dentist/oral surgeon advised.  Discussed optimal duration of bisphosphonate use is unknown - drug holiday after 5-10 po versus drug holiday after 3 years of reclast treatment

## 2023-08-29 NOTE — PROGRESS NOTES
"Subjective:      Patient ID: Ebony Angel is a 68 y.o. female presented to [facility] on 8/29/2023.  Chief complaint:  Osteoporosis    History of Present illness: Ebony Angel is a 68 y.o. female here for osteoporosis  Other significant past medical history: HTN      Osteoporosis  - Diagnosis on DXA in chronically >10 years, osteopenia with high FRAX  - Was on on Fosamax > stop it herself many years ago  - Current medication: None  - Vit D intake?/Calcium intake?   - History of falls over the last 2 years: no  - History of fractures to wrist, hip or spine: no    Osteoporosis Risk Factor Assessment:  - Family history of osteoporosis: yes, Mom with Osteoporosis   - Family history of fractures of hip: yes, both mom: fell broke pelvic 2013  and dad: fall broke hip 2017  - History of loss of height of more than 1 1/2 to 2 inches: no, measured height in clinic: 4'10.5"  - Age of menopause: early 40s, no Hysterectomy, No: use of HRT  - Tobacco use, including use in the past:  no   - Patient lives with:  Family at home. Without Stairs  - Walking gait:  Unsteady occasionally if she turns quickly  - Weight bearing exercise?   no  specific     Medical hx  - Dental work planned? no, sees dentist regularly, routine cleaning  - Chronic kidney disease: yes, CKD stage 3b  - History of Hyperparathyrodism, no  - History of kidney stones, no  - History of cancer or malignancy, history of malignancy, or prior radiation treatment , no    Recent DXA:   09/29/2022  Lumbar spine (L1-L4):  BMD is 1.234 g/cm2, T-score is 1.7, and Z-score is 3.6.  Total hip:  BMD is 0.725 g/cm2, T-score is -1.8, and Z-score is -0.4.  Femoral neck: BMD is 0.587 g/cm2, T-score is -2.4, and Z-score is -0.7.  Distal 1/3 radius:  Not applicable     FRAX:  21% risk of a major osteoporotic fracture in the next 10 years.  4.1% risk of hip fracture in the next 10 years.     Impression:  *Osteoporosis based on " "T-score between -1.0 and -2.5 and elevated risk based on FRAX    Lab work reviewed  Lab Results   Component Value Date    CALCIUM 9.9 07/19/2023    CALCIUM 9.8 06/23/2023    CALCIUM 10.3 12/20/2022    ALKPHOS 35 (L) 06/23/2023    ALKPHOS 47 (L) 12/20/2022    ALKPHOS 38 (L) 11/11/2021    TSH 1.502 12/20/2022     Reviewed past surgical, medical, family, social history and updated as appropriate.  Review of Systems: see HPI above  Objective:   BP (!) 145/88   Pulse 82   Temp 98.2 °F (36.8 °C) (Oral)   Ht 4' 10.5" (1.486 m)   Wt 52.5 kg (115 lb 12.8 oz)   BMI 23.79 kg/m²   Body mass index is 23.79 kg/m².  Vital signs reviewed    Physical Exam  Vitals and nursing note reviewed.   Constitutional:       Appearance: Normal appearance. She is well-developed. She is not ill-appearing.   Neck:      Thyroid: No thyromegaly.   Pulmonary:      Effort: Pulmonary effort is normal. No respiratory distress.   Musculoskeletal:         General: Normal range of motion.      Cervical back: Normal range of motion.   Neurological:      General: No focal deficit present.      Mental Status: She is alert. Mental status is at baseline.   Psychiatric:         Mood and Affect: Mood normal.         Behavior: Behavior normal.     Labs reviewed:  See results in subjective  Lab Results   Component Value Date    HGBA1C 6.5 (H) 06/23/2023     Lab Results   Component Value Date    CHOL 112 (L) 06/23/2023    HDL 40 06/23/2023    LDLCALC 56.8 (L) 06/23/2023    TRIG 76 06/23/2023    CHOLHDL 35.7 06/23/2023     Lab Results   Component Value Date     (L) 07/19/2023    K 4.3 07/19/2023    CL 98 07/19/2023    CO2 22 (L) 07/19/2023     (H) 07/19/2023    BUN 27 (H) 07/19/2023    CREATININE 1.6 (H) 07/19/2023    CALCIUM 9.9 07/19/2023    PROT 6.8 06/23/2023    ALBUMIN 3.8 06/23/2023    BILITOT 0.5 06/23/2023    ALKPHOS 35 (L) 06/23/2023    AST 19 06/23/2023    ALT 11 06/23/2023    ANIONGAP 10 07/19/2023    ESTGFRAFRICA 49.4 (A) 11/11/2021    " EGFRNONAA 42.9 (A) 11/11/2021    TSH 1.502 12/20/2022     Assessment     1. Age-related osteoporosis without current pathological fracture  Comprehensive Metabolic Panel    Vitamin D      2. Osteoporosis, unspecified osteoporosis type, unspecified pathological fracture presence  Ambulatory referral/consult to Endocrinology      3. Stage 3b chronic kidney disease           Plan     Age-related osteoporosis without current pathological fracture  - Patient with Osteoporosis as been treated in the past  - Discussed with pt diagnosis of osteoporosis.  Bone density review with patient and discussed with patient in clinic.   - previously patient was on Fosamax, duration of therapy unknown  - Risk factors include:  Postmenopausal, CKD stage 3B  - High fall risk, CKD stage3, not candidate for oral bisphosphonate    Plan  - Treatment options and potential side effects discussed for  SC Denosumab every 6 months  - After long discussion with patient, we agreed to start patient on: SC Denosumab  - Recommend the patient take sufficient calcium (1000 mg/day in divided doses) and vitamin D3 (2000 units a day), written instruction given  - Routine lab work>> 6 months: check CMP, vitamin-D  - Next DXA: 2 years from most current, 2025  - Fall precautions/Exercise regimen: advised, (weight bearing exercises: handout given)  - Routine dental health screening advised, cleaning every 6 months     Duration of therapy  SC Prolia: monitor after 2 years of treatment with DXA,  can continue indefinitely    Risk of osteonecrosis of the jaw (DISCUSSED) with bisphosphonates and denosumab, with incidence estimated from 1-10/084557 treated patients. The incidence of ONJ is higher in patients undergoing invasive dental surgery (excludes routine cleaning, fillings or root canals).   Dental health: Advised dental work should ideally be completed prior to initiation of treatment, follow up with dentist/oral surgeon advised.  Discussed optimal duration  of bisphosphonate use is unknown - drug holiday after 5-10 po versus drug holiday after 3 years of reclast treatment     Stage 3b chronic kidney disease  - Renal function reviewed on lab work today, stable   - continue routine monitoring    Advised patient to follow up with PCP for routine health maintenance care.   RTC in 6 months    Willis Medina M.D.  Endocrinology  [facility]  8/29/2023      Disclaimer: This note has been generated in part with the use of voice-recognition software. There may be typographical errors that have been missed during proof-reading.

## 2023-09-20 ENCOUNTER — INFUSION (OUTPATIENT)
Dept: INFUSION THERAPY | Facility: HOSPITAL | Age: 68
End: 2023-09-20
Payer: MEDICARE

## 2023-09-20 DIAGNOSIS — N18.32 STAGE 3B CHRONIC KIDNEY DISEASE: ICD-10-CM

## 2023-09-20 DIAGNOSIS — M81.0 AGE-RELATED OSTEOPOROSIS WITHOUT CURRENT PATHOLOGICAL FRACTURE: Primary | ICD-10-CM

## 2023-09-20 PROCEDURE — 63600175 PHARM REV CODE 636 W HCPCS: Mod: JZ,JG | Performed by: HOSPITALIST

## 2023-09-20 PROCEDURE — 96372 THER/PROPH/DIAG INJ SC/IM: CPT

## 2023-09-20 RX ADMIN — DENOSUMAB 60 MG: 60 INJECTION SUBCUTANEOUS at 02:09

## 2023-09-20 NOTE — NURSING
Pt here for Prolia injection. Pt endorses taking daily Ca++ and Vit D; denies jaw pain or recent/upcoming dental procedures. Administered injection in back of right arm. No questions or concerns. Pt ambulated out of unit unassisted.

## 2023-11-14 ENCOUNTER — PATIENT OUTREACH (OUTPATIENT)
Dept: ADMINISTRATIVE | Facility: HOSPITAL | Age: 68
End: 2023-11-14
Payer: MEDICARE

## 2023-11-14 ENCOUNTER — TELEPHONE (OUTPATIENT)
Dept: ADMINISTRATIVE | Facility: HOSPITAL | Age: 68
End: 2023-11-14
Payer: MEDICARE

## 2023-11-14 VITALS — SYSTOLIC BLOOD PRESSURE: 138 MMHG | DIASTOLIC BLOOD PRESSURE: 88 MMHG

## 2023-11-14 NOTE — PROGRESS NOTES

## 2023-11-27 NOTE — PROGRESS NOTES
Subjective:       Patient ID: Ebony Angel is a 68 y.o. female.    Chief Complaint:   Follow-up, Diabetes, and Hypertension    HPI: Mrs Cooper presents for follow up the above  She is doing well and just moved into her new home in [place]  She spent her 1st Thanksgiving there and enjoyed being with her family  HTN:Med TX 1-Avapro 300 mg QD, 2-Coreg 6.25 mg BID  DM: Med Tx 1- Glucophage 500mg BID        Accuchecks:  None recently   HLD: Med Tx 1- Zocor 40mg QD, 2-Fenofibrate 160mg QD  Newly Dx'd Osteoporosis: s/p Endocrine Eval/Dr Medina(8/23) s/p Last Bone Density(9/22) + Osteoporosis in presence of CKD Stage 3;Prolia recommended per Dr Medina and started 10/23; She was a little achy after the prolia, but this is better now      Past Medical, Surgical, Social History: Please see as stated in Epic chart which has been reviewed.    Current Outpatient Medications   Medication Sig Dispense Refill    brimonidine 0.15 % OPTH DROP (ALPHAGAN) 0.15 % ophthalmic solution 1 drop 2 (two) times a day.      carvediloL (COREG) 6.25 MG tablet TAKE ONE Tablet BY MOUTH TWICE DAILY WITH MEALS 180 tablet 2    fenofibrate 160 MG Tab TAKE ONE Tablet BY MOUTH ONCE DAILY 90 tablet 2    ferrous sulfate, dried (SLOW FE) 160 mg (50 mg iron) TbSR Take 1 tablet (160 mg total) by mouth once daily. 90 tablet 2    latanoprost 0.005 % ophthalmic solution 1 drop once daily.      metFORMIN (GLUCOPHAGE) 500 MG tablet TAKE ONE Tablet BY MOUTH TWICE DAILY WITH MEALS 180 tablet 2    simvastatin (ZOCOR) 40 MG tablet TAKE ONE Tablet BY MOUTH ONCE DAILY IN THE EVENING 90 tablet 2    irbesartan (AVAPRO) 300 MG tablet Take 1 tablet (300 mg total) by mouth every evening. 90 tablet 2     No current facility-administered medications for this visit.       Review of Systems   Constitutional: Negative.    Respiratory: Negative.     Cardiovascular: Negative.    Gastrointestinal: Negative.    Neurological: Negative.        Objective:      Lab Results  "  Component Value Date    WBC 6.18 11/22/2023    HGB 12.0 11/22/2023    HCT 37.1 11/22/2023     11/22/2023    CHOL 114 (L) 11/22/2023    TRIG 101 11/22/2023    HDL 37 (L) 11/22/2023    ALT 15 11/22/2023    AST 22 11/22/2023     (L) 11/22/2023    K 3.7 11/22/2023     11/22/2023    CREATININE 1.3 11/22/2023    BUN 21 11/22/2023    CO2 22 (L) 11/22/2023    TSH 1.502 12/20/2022    HGBA1C 6.4 (H) 11/22/2023     Physical Exam  Vitals reviewed.   Constitutional:       Appearance: Normal appearance.   HENT:      Head: Normocephalic and atraumatic.   Cardiovascular:      Rate and Rhythm: Normal rate and regular rhythm.      Heart sounds: Normal heart sounds.   Pulmonary:      Breath sounds: Normal breath sounds.   Abdominal:      General: Bowel sounds are normal.      Palpations: Abdomen is soft. There is no mass.      Tenderness: There is no abdominal tenderness.   Musculoskeletal:      Right lower leg: No edema.      Left lower leg: No edema.   Neurological:      Mental Status: She is alert.   Psychiatric:         Mood and Affect: Mood normal.         Vital Signs  Temp: 97.5 °F (36.4 °C)  Temp Source: Other (see comments)  Pulse: 90  Resp: 18  SpO2: 99 %  BP: 138/68  BP Location: Left arm  Patient Position: Sitting  Pain Score: 0-No pain  Height and Weight  Height: 4' 10.5" (148.6 cm)  Weight: 51.8 kg (114 lb 3.2 oz)  BSA (Calculated - sq m): 1.46 sq meters  BMI (Calculated): 23.5  Weight in (lb) to have BMI = 25: 121.4]    Assessment:       1. Type 2 diabetes mellitus with stage 3b chronic kidney disease, without long-term current use of insulin    2. Essential hypertension    3. Hyperlipidemia, unspecified hyperlipidemia type    4. Stage 3b chronic kidney disease    5. Vitamin D deficiency    6. Osteoporosis, unspecified osteoporosis type, unspecified pathological fracture presence    7. Need for vaccination        Plan:     Health Maintenance   Topic Date Due    Colorectal Cancer Screening  Never done "    Shingles Vaccine (1 of 2) Never done    Eye Exam  03/23/2023    Hemoglobin A1c  05/22/2024    Foot Exam  06/28/2024    Mammogram  07/21/2024    DEXA Scan  09/21/2024    Lipid Panel  11/22/2024    Low Dose Statin  11/28/2024    TETANUS VACCINE  07/26/2032    Hepatitis C Screening  Completed        Ebony was seen today for follow-up, diabetes and hypertension.    Diagnoses and all orders for this visit:    Type 2 diabetes mellitus with stage 3b chronic kidney disease, without long-term current use of insulin/controlled        -     Continue: Glucophage 500mg BID  -     Comprehensive Metabolic Panel; Future  -     Hemoglobin A1C; Future    Essential hypertension/controlled off HCTZ        -     Continue: Med TX 1-Avapro 300 mg QD, 2-Coreg 6.25 mg BID  -     Comprehensive Metabolic Panel; Future  -     CBC Auto Differential; Future  -     TSH; Future  -     irbesartan (AVAPRO) 300 MG tablet; Take 1 tablet (300 mg total) by mouth every evening.    Hyperlipidemia, unspecified hyperlipidemia type/controlled        -     Continue: Med Tx 1- Zocor 40mg QD, 2-Fenofibrate 160mg QD  -     Comprehensive Metabolic Panel; Future  -     Lipid Panel; Future    Stage 3b chronic kidney disease/GFR improved off HCTZ        -    Limit NSAIDS/Increase intake of water    Vitamin D deficiency  -     Vitamin D; Future    Osteoporosis, unspecified osteoporosis type, unspecified pathological fracture presence/on Prolia 60mg Q6 months        -     Repeat Bone Density 10/25    Health Maintenance/Need for vaccination  -     Influenza - Quadrivalent (Adjuvanted)        -     RTC x 6 months with 1 week prior fasting lab or see pt sooner prn

## 2023-11-28 ENCOUNTER — OFFICE VISIT (OUTPATIENT)
Dept: INTERNAL MEDICINE | Facility: CLINIC | Age: 68
End: 2023-11-28
Payer: MEDICARE

## 2023-11-28 VITALS
SYSTOLIC BLOOD PRESSURE: 138 MMHG | DIASTOLIC BLOOD PRESSURE: 68 MMHG | WEIGHT: 114.19 LBS | HEART RATE: 90 BPM | OXYGEN SATURATION: 99 % | HEIGHT: 59 IN | TEMPERATURE: 98 F | RESPIRATION RATE: 18 BRPM | BODY MASS INDEX: 23.02 KG/M2

## 2023-11-28 DIAGNOSIS — N18.32 TYPE 2 DIABETES MELLITUS WITH STAGE 3B CHRONIC KIDNEY DISEASE, WITHOUT LONG-TERM CURRENT USE OF INSULIN: Primary | ICD-10-CM

## 2023-11-28 DIAGNOSIS — N18.32 STAGE 3B CHRONIC KIDNEY DISEASE: ICD-10-CM

## 2023-11-28 DIAGNOSIS — E78.5 HYPERLIPIDEMIA, UNSPECIFIED HYPERLIPIDEMIA TYPE: ICD-10-CM

## 2023-11-28 DIAGNOSIS — Z23 NEED FOR VACCINATION: ICD-10-CM

## 2023-11-28 DIAGNOSIS — E55.9 VITAMIN D DEFICIENCY: ICD-10-CM

## 2023-11-28 DIAGNOSIS — I10 ESSENTIAL HYPERTENSION: ICD-10-CM

## 2023-11-28 DIAGNOSIS — M81.0 OSTEOPOROSIS, UNSPECIFIED OSTEOPOROSIS TYPE, UNSPECIFIED PATHOLOGICAL FRACTURE PRESENCE: ICD-10-CM

## 2023-11-28 DIAGNOSIS — E11.22 TYPE 2 DIABETES MELLITUS WITH STAGE 3B CHRONIC KIDNEY DISEASE, WITHOUT LONG-TERM CURRENT USE OF INSULIN: Primary | ICD-10-CM

## 2023-11-28 PROCEDURE — 4010F PR ACE/ARB THEARPY RXD/TAKEN: ICD-10-PCS | Mod: CPTII,S$GLB,, | Performed by: INTERNAL MEDICINE

## 2023-11-28 PROCEDURE — 3008F BODY MASS INDEX DOCD: CPT | Mod: CPTII,S$GLB,, | Performed by: INTERNAL MEDICINE

## 2023-11-28 PROCEDURE — 3008F PR BODY MASS INDEX (BMI) DOCUMENTED: ICD-10-PCS | Mod: CPTII,S$GLB,, | Performed by: INTERNAL MEDICINE

## 2023-11-28 PROCEDURE — 3044F PR MOST RECENT HEMOGLOBIN A1C LEVEL <7.0%: ICD-10-PCS | Mod: CPTII,S$GLB,, | Performed by: INTERNAL MEDICINE

## 2023-11-28 PROCEDURE — 3288F FALL RISK ASSESSMENT DOCD: CPT | Mod: CPTII,S$GLB,, | Performed by: INTERNAL MEDICINE

## 2023-11-28 PROCEDURE — 1126F PR PAIN SEVERITY QUANTIFIED, NO PAIN PRESENT: ICD-10-PCS | Mod: CPTII,S$GLB,, | Performed by: INTERNAL MEDICINE

## 2023-11-28 PROCEDURE — 99999 PR PBB SHADOW E&M-EST. PATIENT-LVL V: ICD-10-PCS | Mod: PBBFAC,,, | Performed by: INTERNAL MEDICINE

## 2023-11-28 PROCEDURE — G0008 ADMIN INFLUENZA VIRUS VAC: HCPCS | Mod: S$GLB,,, | Performed by: INTERNAL MEDICINE

## 2023-11-28 PROCEDURE — 3078F PR MOST RECENT DIASTOLIC BLOOD PRESSURE < 80 MM HG: ICD-10-PCS | Mod: CPTII,S$GLB,, | Performed by: INTERNAL MEDICINE

## 2023-11-28 PROCEDURE — 3066F NEPHROPATHY DOC TX: CPT | Mod: CPTII,S$GLB,, | Performed by: INTERNAL MEDICINE

## 2023-11-28 PROCEDURE — 4010F ACE/ARB THERAPY RXD/TAKEN: CPT | Mod: CPTII,S$GLB,, | Performed by: INTERNAL MEDICINE

## 2023-11-28 PROCEDURE — 3078F DIAST BP <80 MM HG: CPT | Mod: CPTII,S$GLB,, | Performed by: INTERNAL MEDICINE

## 2023-11-28 PROCEDURE — 1126F AMNT PAIN NOTED NONE PRSNT: CPT | Mod: CPTII,S$GLB,, | Performed by: INTERNAL MEDICINE

## 2023-11-28 PROCEDURE — 1159F MED LIST DOCD IN RCRD: CPT | Mod: CPTII,S$GLB,, | Performed by: INTERNAL MEDICINE

## 2023-11-28 PROCEDURE — 3075F PR MOST RECENT SYSTOLIC BLOOD PRESS GE 130-139MM HG: ICD-10-PCS | Mod: CPTII,S$GLB,, | Performed by: INTERNAL MEDICINE

## 2023-11-28 PROCEDURE — 99999 PR PBB SHADOW E&M-EST. PATIENT-LVL V: CPT | Mod: PBBFAC,,, | Performed by: INTERNAL MEDICINE

## 2023-11-28 PROCEDURE — 1159F PR MEDICATION LIST DOCUMENTED IN MEDICAL RECORD: ICD-10-PCS | Mod: CPTII,S$GLB,, | Performed by: INTERNAL MEDICINE

## 2023-11-28 PROCEDURE — 3288F PR FALLS RISK ASSESSMENT DOCUMENTED: ICD-10-PCS | Mod: CPTII,S$GLB,, | Performed by: INTERNAL MEDICINE

## 2023-11-28 PROCEDURE — 3066F PR DOCUMENTATION OF TREATMENT FOR NEPHROPATHY: ICD-10-PCS | Mod: CPTII,S$GLB,, | Performed by: INTERNAL MEDICINE

## 2023-11-28 PROCEDURE — 3061F PR NEG MICROALBUMINURIA RESULT DOCUMENTED/REVIEW: ICD-10-PCS | Mod: CPTII,S$GLB,, | Performed by: INTERNAL MEDICINE

## 2023-11-28 PROCEDURE — 3075F SYST BP GE 130 - 139MM HG: CPT | Mod: CPTII,S$GLB,, | Performed by: INTERNAL MEDICINE

## 2023-11-28 PROCEDURE — 99215 OFFICE O/P EST HI 40 MIN: CPT | Mod: S$GLB,,, | Performed by: INTERNAL MEDICINE

## 2023-11-28 PROCEDURE — 1101F PR PT FALLS ASSESS DOC 0-1 FALLS W/OUT INJ PAST YR: ICD-10-PCS | Mod: CPTII,S$GLB,, | Performed by: INTERNAL MEDICINE

## 2023-11-28 PROCEDURE — 90694 FLU VACCINE - QUADRIVALENT - ADJUVANTED: ICD-10-PCS | Mod: S$GLB,,, | Performed by: INTERNAL MEDICINE

## 2023-11-28 PROCEDURE — 1101F PT FALLS ASSESS-DOCD LE1/YR: CPT | Mod: CPTII,S$GLB,, | Performed by: INTERNAL MEDICINE

## 2023-11-28 PROCEDURE — 3044F HG A1C LEVEL LT 7.0%: CPT | Mod: CPTII,S$GLB,, | Performed by: INTERNAL MEDICINE

## 2023-11-28 PROCEDURE — 3061F NEG MICROALBUMINURIA REV: CPT | Mod: CPTII,S$GLB,, | Performed by: INTERNAL MEDICINE

## 2023-11-28 PROCEDURE — 99215 PR OFFICE/OUTPT VISIT, EST, LEVL V, 40-54 MIN: ICD-10-PCS | Mod: S$GLB,,, | Performed by: INTERNAL MEDICINE

## 2023-11-28 PROCEDURE — G0008 FLU VACCINE - QUADRIVALENT - ADJUVANTED: ICD-10-PCS | Mod: S$GLB,,, | Performed by: INTERNAL MEDICINE

## 2023-11-28 PROCEDURE — 90694 VACC AIIV4 NO PRSRV 0.5ML IM: CPT | Mod: S$GLB,,, | Performed by: INTERNAL MEDICINE

## 2023-11-28 RX ORDER — IRBESARTAN 300 MG/1
300 TABLET ORAL NIGHTLY
Qty: 90 TABLET | Refills: 2 | Status: SHIPPED | OUTPATIENT
Start: 2023-11-28 | End: 2024-11-27

## 2023-11-28 NOTE — PATIENT INSTRUCTIONS
Reminder:  #1-Get the RSV Vaccine at local pharmacy or at [facility] any time after 12/12/23

## 2024-02-29 ENCOUNTER — TELEPHONE (OUTPATIENT)
Dept: ENDOCRINOLOGY | Facility: CLINIC | Age: 69
End: 2024-02-29

## 2024-02-29 ENCOUNTER — OFFICE VISIT (OUTPATIENT)
Dept: ENDOCRINOLOGY | Facility: CLINIC | Age: 69
End: 2024-02-29
Payer: MEDICARE

## 2024-02-29 VITALS
DIASTOLIC BLOOD PRESSURE: 76 MMHG | WEIGHT: 113.38 LBS | SYSTOLIC BLOOD PRESSURE: 140 MMHG | BODY MASS INDEX: 23.3 KG/M2 | HEART RATE: 96 BPM

## 2024-02-29 DIAGNOSIS — N18.32 TYPE 2 DIABETES MELLITUS WITH STAGE 3B CHRONIC KIDNEY DISEASE, WITHOUT LONG-TERM CURRENT USE OF INSULIN: ICD-10-CM

## 2024-02-29 DIAGNOSIS — N18.32 STAGE 3B CHRONIC KIDNEY DISEASE: ICD-10-CM

## 2024-02-29 DIAGNOSIS — M81.0 AGE-RELATED OSTEOPOROSIS WITHOUT CURRENT PATHOLOGICAL FRACTURE: Primary | ICD-10-CM

## 2024-02-29 DIAGNOSIS — E11.22 TYPE 2 DIABETES MELLITUS WITH STAGE 3B CHRONIC KIDNEY DISEASE, WITHOUT LONG-TERM CURRENT USE OF INSULIN: ICD-10-CM

## 2024-02-29 PROCEDURE — 3078F DIAST BP <80 MM HG: CPT | Mod: CPTII,S$GLB,, | Performed by: HOSPITALIST

## 2024-02-29 PROCEDURE — 3008F BODY MASS INDEX DOCD: CPT | Mod: CPTII,S$GLB,, | Performed by: HOSPITALIST

## 2024-02-29 PROCEDURE — 3077F SYST BP >= 140 MM HG: CPT | Mod: CPTII,S$GLB,, | Performed by: HOSPITALIST

## 2024-02-29 PROCEDURE — 1101F PT FALLS ASSESS-DOCD LE1/YR: CPT | Mod: CPTII,S$GLB,, | Performed by: HOSPITALIST

## 2024-02-29 PROCEDURE — 99999 PR PBB SHADOW E&M-EST. PATIENT-LVL III: CPT | Mod: PBBFAC,,, | Performed by: HOSPITALIST

## 2024-02-29 PROCEDURE — 3288F FALL RISK ASSESSMENT DOCD: CPT | Mod: CPTII,S$GLB,, | Performed by: HOSPITALIST

## 2024-02-29 PROCEDURE — 99214 OFFICE O/P EST MOD 30 MIN: CPT | Mod: S$GLB,,, | Performed by: HOSPITALIST

## 2024-02-29 PROCEDURE — 1159F MED LIST DOCD IN RCRD: CPT | Mod: CPTII,S$GLB,, | Performed by: HOSPITALIST

## 2024-02-29 NOTE — PROGRESS NOTES
"Subjective:      Patient ID: Ebony Angel is a 69 y.o. female presented to [facility] on 2/29/2024.  Chief complaint:  Osteoporosis    History of Present illness: Ebony Angel is a 69 y.o. female here for osteoporosis  Other significant past medical history: HTN, CKD3a    Interval history:  Here for follow-up osteoporosis.  Started on SC Prolia, tolerated well.  No issue   No recent falls.  Sees dentist regularly, no plan  Will need Prolia in 6 months, next 1 to be done by  by March 20th 2024, will need schedule at [facility]  Walking regiment, recent moved to [place]  Reported on calcium and vitamin-D supplements    1) Osteoporosis  - Diagnosis on DXA in chronically >10 years, osteopenia with high FRAX  - Was on on Fosamax > stop it herself many years ago  - Does have CKD stage IIIA  - Current medication: Prolia SC injection every 6 months. 1st injection: 9/20/2023, injections so far 1 out of 4  - Vit D intake?/Calcium intake? 600 mg calcium  - History of falls over the last 2 years: no  - History of fractures to wrist, hip or spine: no    Osteoporosis Risk Factor Assessment:  - Family history of osteoporosis: yes, Mom with Osteoporosis   - Family history of fractures of hip: yes, both mom: fell broke pelvic 2013  and dad: fall broke hip 2017  - History of loss of height of more than 1 1/2 to 2 inches: no, measured height in clinic: 4'10.5"  - Age of menopause: early 40s, no Hysterectomy, No: use of HRT  - Tobacco use, including use in the past:  no   - Patient lives with:  Family at home. Without Stairs  - Walking gait:  Unsteady occasionally if she turns quickly  - Weight bearing exercise?   no  specific     Medical hx  - Dental work planned? no, sees dentist regularly, routine cleaning  - Chronic kidney disease: yes, CKD stage 3b  - History of Hyperparathyrodism, no  - History of kidney stones, no  - History of cancer or malignancy, history of " malignancy, or prior radiation treatment , no    Recent DXA:   09/29/2022  Lumbar spine (L1-L4):  BMD is 1.234 g/cm2, T-score is 1.7, and Z-score is 3.6.  Total hip:  BMD is 0.725 g/cm2, T-score is -1.8, and Z-score is -0.4.  Femoral neck: BMD is 0.587 g/cm2, T-score is -2.4, and Z-score is -0.7.  Distal 1/3 radius:  Not applicable     FRAX:  21% risk of a major osteoporotic fracture in the next 10 years.  4.1% risk of hip fracture in the next 10 years.     Impression:  *Osteoporosis based on T-score between -1.0 and -2.5 and elevated risk based on FRAX    Lab work reviewed  Lab Results   Component Value Date    OSEKSKIS93TU 63 02/22/2024    CALCIUM 10.1 02/22/2024    CALCIUM 9.4 11/22/2023    CALCIUM 9.9 07/19/2023    ALKPHOS 31 (L) 02/22/2024    ALKPHOS 31 (L) 11/22/2023    ALKPHOS 35 (L) 06/23/2023    TSH 1.502 12/20/2022     2) Type 2 Diabetes  -  Well controlled   - A1C 6.4%  - On metformin  500 mg twice day     Reviewed past surgical, medical, family, social history and updated as appropriate.  Review of Systems: see HPI above  Objective:   BP (!) 140/76   Pulse 96   Wt 51.4 kg (113 lb 6.4 oz)   BMI 23.30 kg/m²   Body mass index is 23.3 kg/m².  Vital signs reviewed    Physical Exam  Vitals and nursing note reviewed.   Constitutional:       Appearance: Normal appearance. She is well-developed. She is not ill-appearing.   Neck:      Thyroid: No thyromegaly.   Pulmonary:      Effort: Pulmonary effort is normal. No respiratory distress.   Musculoskeletal:         General: Normal range of motion.      Cervical back: Normal range of motion.   Neurological:      General: No focal deficit present.      Mental Status: She is alert. Mental status is at baseline.   Psychiatric:         Mood and Affect: Mood normal.         Behavior: Behavior normal.     Labs reviewed:  See results in subjective  Lab Results   Component Value Date    HGBA1C 6.4 (H) 11/22/2023     Lab Results   Component Value Date    CHOL 114 (L)  11/22/2023    HDL 37 (L) 11/22/2023    LDLCALC 56.8 (L) 11/22/2023    TRIG 101 11/22/2023    CHOLHDL 32.5 11/22/2023     Lab Results   Component Value Date     02/22/2024    K 4.1 02/22/2024     02/22/2024    CO2 22 (L) 02/22/2024     (H) 02/22/2024    BUN 23 02/22/2024    CREATININE 1.3 02/22/2024    CALCIUM 10.1 02/22/2024    PROT 7.0 02/22/2024    ALBUMIN 3.8 02/22/2024    BILITOT 0.6 02/22/2024    ALKPHOS 31 (L) 02/22/2024    AST 18 02/22/2024    ALT 11 02/22/2024    ANIONGAP 8 02/22/2024    ESTGFRAFRICA 49.4 (A) 11/11/2021    EGFRNONAA 42.9 (A) 11/11/2021    TSH 1.502 12/20/2022    NLEFLLNE36KG 63 02/22/2024     Assessment     1. Age-related osteoporosis without current pathological fracture  Basic Metabolic Panel    Vitamin D      2. Stage 3b chronic kidney disease        3. Type 2 diabetes mellitus with stage 3b chronic kidney disease, without long-term current use of insulin             Plan     Age-related osteoporosis without current pathological fracture  - Patient with Osteoporosis as been treated in the past, chronically >10 years, osteopenia with high FRAX  - Was on on Fosamax > stop it herself many years ago, Does have CKD stage IIIA  - Current medication: Prolia SC injection every 6 months. 1st injection: 9/20/2023, injections so far 1 out of 4  - Vit D intake?/Calcium intake? 600 mg calcium plus vitamin-D    Plan  - treatment plan renew for 2024: Prolia injection 3/20/2024.  Message if use in center to schedule.  - continue Prolia/Denosumab every 6 months  - continue supplementation:  calcium and vitamin D3  - Routine lab work>> 6 months: check CMP, vitamin-D  - Next DXA: 2 years from most current, 2025  - Fall precautions/Exercise regimen: advised, (weight bearing exercises: handout given)  - Routine dental health screening advised, cleaning every 6 months    Stage 3b chronic kidney disease  - Renal function reviewed on lab work today, stable   - continue routine  monitoring    Type 2 diabetes mellitus with stage 3b chronic kidney disease, without long-term current use of insulin  -diabetes controlled managed by PCP    Advised patient to follow up with PCP for routine health maintenance care.   RTC in 6 months    Willis Medina M.D.  Endocrinology  [facility]  2/29/2024      Disclaimer: This note has been generated in part with the use of voice-recognition software. There may be typographical errors that have been missed during proof-reading.

## 2024-02-29 NOTE — ASSESSMENT & PLAN NOTE
- Patient with Osteoporosis as been treated in the past, chronically >10 years, osteopenia with high FRAX  - Was on on Fosamax > stop it herself many years ago, Does have CKD stage IIIA  - Current medication: Prolia SC injection every 6 months. 1st injection: 9/20/2023, injections so far 1 out of 4  - Vit D intake?/Calcium intake? 600 mg calcium plus vitamin-D    Plan  - treatment plan renew for 2024: Prolia injection 3/20/2024.  Message if use in center to schedule.  - continue Prolia/Denosumab every 6 months  - continue supplementation:  calcium and vitamin D3  - Routine lab work>> 6 months: check CMP, vitamin-D  - Next DXA: 2 years from most current, 2025  - Fall precautions/Exercise regimen: advised, (weight bearing exercises: handout given)  - Routine dental health screening advised, cleaning every 6 months

## 2024-02-29 NOTE — TELEPHONE ENCOUNTER
----- Message from Willis Medina MD sent at 2/29/2024  1:08 PM CST -----  Please call patient and advise that she was scheduled for her Prolia injection at the [facility] at 3/20/2024 1:00 PM

## 2024-03-20 ENCOUNTER — INFUSION (OUTPATIENT)
Dept: INFUSION THERAPY | Facility: HOSPITAL | Age: 69
End: 2024-03-20
Payer: MEDICARE

## 2024-03-20 DIAGNOSIS — M81.0 AGE-RELATED OSTEOPOROSIS WITHOUT CURRENT PATHOLOGICAL FRACTURE: ICD-10-CM

## 2024-03-20 DIAGNOSIS — N18.32 STAGE 3B CHRONIC KIDNEY DISEASE: Primary | ICD-10-CM

## 2024-03-20 PROCEDURE — 63600175 PHARM REV CODE 636 W HCPCS: Mod: JZ,JG | Performed by: HOSPITALIST

## 2024-03-20 PROCEDURE — 96372 THER/PROPH/DIAG INJ SC/IM: CPT

## 2024-03-20 RX ADMIN — DENOSUMAB 60 MG: 60 INJECTION SUBCUTANEOUS at 01:03

## 2024-03-20 NOTE — NURSING
Pt here for Prolia. Tolerated injection to right arm. Pt reports taking calcium and Vit D and no recent dental procedures or jaw pain. D/c in stable condition.

## 2024-05-06 DIAGNOSIS — E11.9 CONTROLLED TYPE 2 DIABETES MELLITUS WITHOUT COMPLICATION, WITHOUT LONG-TERM CURRENT USE OF INSULIN: ICD-10-CM

## 2024-05-06 DIAGNOSIS — E78.5 HYPERLIPIDEMIA, UNSPECIFIED HYPERLIPIDEMIA TYPE: ICD-10-CM

## 2024-05-06 DIAGNOSIS — I10 HYPERTENSION, ESSENTIAL: ICD-10-CM

## 2024-05-06 RX ORDER — CARVEDILOL 6.25 MG/1
6.25 TABLET ORAL 2 TIMES DAILY WITH MEALS
Qty: 180 TABLET | Refills: 1 | Status: SHIPPED | OUTPATIENT
Start: 2024-05-06

## 2024-05-06 RX ORDER — SIMVASTATIN 40 MG/1
TABLET, FILM COATED ORAL
Qty: 90 TABLET | Refills: 1 | Status: SHIPPED | OUTPATIENT
Start: 2024-05-06

## 2024-05-06 RX ORDER — METFORMIN HYDROCHLORIDE 500 MG/1
500 TABLET ORAL 2 TIMES DAILY WITH MEALS
Qty: 180 TABLET | Refills: 0 | Status: SHIPPED | OUTPATIENT
Start: 2024-05-06

## 2024-05-06 RX ORDER — FENOFIBRATE 160 MG/1
160 TABLET ORAL
Qty: 90 TABLET | Refills: 1 | Status: SHIPPED | OUTPATIENT
Start: 2024-05-06

## 2024-05-06 NOTE — TELEPHONE ENCOUNTER
Refill Routing Note   Medication(s) are not appropriate for processing by [facility] for the following reason(s):        Required vitals abnormal  Required labs abnormal    [facility] action(s):  Defer  Approve     Requires labs : Yes      Medication Therapy Plan: FLOS    Pharmacist review requested: Yes     Appointments  past 12m or future 3m with PCP    Date Provider   Last Visit   11/28/2023 Iban Martinez MD   Next Visit   5/28/2024 Iban Martinez MD   ED visits in past 90 days: 0        Note composed:12:48 PM 05/06/2024

## 2024-05-06 NOTE — TELEPHONE ENCOUNTER
Refill Routing Note   Medication(s) are not appropriate for processing by [facility] for the following reason(s):        Required vitals abnormal    [facility] action(s):  Defer  Approve      Medication Therapy Plan: FLOS; stable GFR    Pharmacist review requested: Yes     Appointments  past 12m or future 3m with PCP    Date Provider   Last Visit   11/28/2023 Iban Martinez MD   Next Visit   5/28/2024 Iban Martinez MD   ED visits in past 90 days: 0        Note composed:2:17 PM 05/06/2024

## 2024-05-06 NOTE — TELEPHONE ENCOUNTER
Care Due:                  Date            Visit Type   Department     Provider  --------------------------------------------------------------------------------                                EP -                              PRIMARY      [facility] INTERNAL  Last Visit: 11-      CARE ([facility])   [facility]       [facility] -                              PRIMARY      [facility] INTERNAL  Next Visit: 05-      CARE ([facility])   [facility]                                                            Last  Test          Frequency    Reason                     Performed    Due Date  --------------------------------------------------------------------------------    HBA1C.......  6 months...  metFORMIN................  11- 05-    [facility] Embedded Care Due Messages. Reference number: [id number].   5/06/2024 12:45:49 PM CDT

## 2024-05-15 ENCOUNTER — PATIENT OUTREACH (OUTPATIENT)
Dept: ADMINISTRATIVE | Facility: HOSPITAL | Age: 69
End: 2024-05-15
Payer: MEDICARE

## 2024-05-15 DIAGNOSIS — Z12.31 ENCOUNTER FOR SCREENING MAMMOGRAM FOR MALIGNANT NEOPLASM OF BREAST: ICD-10-CM

## 2024-05-15 DIAGNOSIS — E11.9 TYPE 2 DIABETES MELLITUS WITHOUT COMPLICATION, WITHOUT LONG-TERM CURRENT USE OF INSULIN: Primary | ICD-10-CM

## 2024-05-15 NOTE — PROGRESS NOTES
Population Health Chart Review & Patient Outreach Details      Additional [facility] Health Notes:               Updates Requested / Reviewed:      Care Everywhere, , and Immunizations Reconciliation Completed or Queried: [facility] Topics Overdue:      [facility] Score: 2     Colon Cancer Screening  Uncontrolled BP    Shingles/Zoster Vaccine  RSV Vaccine                  Health Maintenance Topic(s) Outreach Outcomes & Actions Taken:    Primary Care Appt - Outreach Outcomes & Actions Taken  : Primary Care Appt Scheduled    Breast Cancer Screening - Outreach Outcomes & Actions Taken  : Mammogram Order Placed    Lab(s) - Outreach Outcomes & Actions Taken  : Overdue Lab(s) Ordered and Overdue Lab(s) Scheduled

## 2024-05-16 ENCOUNTER — PATIENT OUTREACH (OUTPATIENT)
Dept: ADMINISTRATIVE | Facility: HOSPITAL | Age: 69
End: 2024-05-16
Payer: MEDICARE

## 2024-05-27 NOTE — PROGRESS NOTES
Subjective:       Patient ID: Ebony Angel is a 69 y.o. female.    Chief Complaint:   Follow-up, Hypertension, Diabetes, and Hyperlipidemia    HPI: Mrs Angel presents for follow up  She is doing well and has moved into her new home in [place]  She has no complaints  HTN:Med TX 1-Avapro 300 mg QD, 2-Coreg 6.25 mg BID  DM: Med Tx 1- Glucophage 500mg BID        Accuchecks:  Running ok/No recent hypoglycemic spells    HLD: Med Tx 1- Zocor 40mg QD, 2-Fenofibrate 160mg QD    Newly Dx'd Osteoporosis: s/p Endocrine Eval/Dr Medina(8/23) s/p Last Bone Density(9/22) + Osteoporosis in presence of CKD Stage 3;Prolia recommended per Dr Medina and started 10/23/Last dose given 3/20/24    Past Medical, Surgical, Social History: Please see as stated in Epic chart which has been reviewed.    Current Outpatient Medications   Medication Sig Dispense Refill    brimonidine 0.15 % OPTH DROP (ALPHAGAN) 0.15 % ophthalmic solution 1 drop 2 (two) times a day.      carvediloL (COREG) 6.25 MG tablet TAKE ONE Tablet BY MOUTH TWICE DAILY WITH MEALS 180 tablet 1    fenofibrate 160 MG Tab TAKE ONE Tablet BY MOUTH ONCE DAILY 90 tablet 1    ferrous sulfate, dried (SLOW FE) 160 mg (50 mg iron) TbSR Take 1 tablet (160 mg total) by mouth once daily. 90 tablet 2    irbesartan (AVAPRO) 300 MG tablet Take 1 tablet (300 mg total) by mouth every evening. 90 tablet 2    latanoprost 0.005 % ophthalmic solution 1 drop once daily.      metFORMIN (GLUCOPHAGE) 500 MG tablet TAKE ONE Tablet BY MOUTH TWICE DAILY WITH MEALS 180 tablet 0    simvastatin (ZOCOR) 40 MG tablet TAKE ONE Tablet BY MOUTH ONCE DAILY IN THE EVENING 90 tablet 1     No current facility-administered medications for this visit.       Review of Systems   Constitutional:  Negative for activity change, fatigue and unexpected weight change.   HENT:  Negative for congestion, sinus pain and trouble swallowing.    Eyes:  Negative for visual disturbance.   Respiratory:  Negative for cough,  chest tightness, shortness of breath and wheezing.    Cardiovascular:  Negative for chest pain, palpitations and leg swelling.   Gastrointestinal:  Positive for constipation. Negative for abdominal pain, anal bleeding, blood in stool, diarrhea, nausea and vomiting.        + Mild constipation/Dark Stools 2ndary to Iron   Genitourinary:  Negative for dysuria, frequency, hematuria, urgency, vaginal bleeding and vaginal discharge.   Musculoskeletal:  Negative for arthralgias, back pain and neck pain.   Skin:  Negative for color change and rash.   Neurological:  Negative for dizziness, syncope, weakness, numbness and headaches.        No focal neurological abnormalities   Psychiatric/Behavioral:  Negative for sleep disturbance.         No depression/anxiety       Objective:      Lab Results   Component Value Date    WBC 6.53 05/21/2024    HGB 11.7 (L) 05/21/2024    HCT 35.5 (L) 05/21/2024     05/21/2024    CHOL 129 05/21/2024    TRIG 99 05/21/2024    HDL 43 05/21/2024    ALT 12 05/21/2024    AST 19 05/21/2024     (L) 05/21/2024    K 4.0 05/21/2024     05/21/2024    CREATININE 1.3 05/21/2024    BUN 22 05/21/2024    CO2 19 (L) 05/21/2024    TSH 1.275 05/21/2024    HGBA1C 6.6 (H) 05/21/2024     Physical Exam  Vitals reviewed.   Constitutional:       Appearance: Normal appearance.   HENT:      Head: Normocephalic and atraumatic.      Mouth/Throat:      Mouth: Mucous membranes are dry.      Pharynx: No posterior oropharyngeal erythema.   Cardiovascular:      Rate and Rhythm: Normal rate and regular rhythm.   Pulmonary:      Effort: Pulmonary effort is normal.      Breath sounds: Normal breath sounds. No wheezing.   Chest:      Chest wall: No tenderness.   Abdominal:      General: Abdomen is flat. Bowel sounds are normal.      Palpations: Abdomen is soft. There is no mass.      Tenderness: There is no abdominal tenderness.   Musculoskeletal:         General: No swelling.      Cervical back: Normal range of  "motion and neck supple. No muscular tenderness.      Right lower leg: No edema.      Left lower leg: No edema.   Lymphadenopathy:      Cervical: No cervical adenopathy.   Skin:     General: Skin is warm and dry.   Neurological:      General: No focal deficit present.      Mental Status: She is alert.   Psychiatric:         Mood and Affect: Mood normal.       Breasts: On gross visualization, normal; On Palaption, no masses, tenderness, or nipple D/C    Vital Signs  Temp: 97.1 °F (36.2 °C)  Temp Source: Temporal  Pulse: 83  SpO2: 99 %  BP: 130/70  BP Location: Right arm  Patient Position: Sitting  Pain Score:   2  Pain Loc: Generalized  Height and Weight  Height: 4' 10.5" (148.6 cm)  Weight: 51.9 kg (114 lb 6.7 oz)  BSA (Calculated - sq m): 1.46 sq meters  BMI (Calculated): 23.5  Weight in (lb) to have BMI = 25: 121.4    Assessment:       1. Type 2 diabetes mellitus with stage 3b chronic kidney disease, without long-term current use of insulin    2. Essential hypertension    3. Hyperlipidemia, unspecified hyperlipidemia type    4. Stage 3b chronic kidney disease    5. Osteoporosis, unspecified osteoporosis type, unspecified pathological fracture presence    6. Encounter for screening mammogram for malignant neoplasm of breast    7. Colon cancer screening        Plan:     Health Maintenance   Topic Date Due    Colorectal Cancer Screening  Never done    Shingles Vaccine (1 of 2) Never done    Foot Exam  06/28/2024    Mammogram  07/21/2024    DEXA Scan  09/21/2024    Hemoglobin A1c  11/21/2024    Eye Exam  01/04/2025    Lipid Panel  05/21/2025    Low Dose Statin  05/28/2025    TETANUS VACCINE  07/26/2032    Hepatitis C Screening  Completed        Ebony "Allison" was seen today for follow-up, hypertension, diabetes and hyperlipidemia.    Diagnoses and all orders for this visit:    Type 2 diabetes mellitus with stage 3b chronic kidney disease, without long-term current use of insulin/controlled        -     Continue: " Glucophage 500mg BID  -     Comprehensive Metabolic Panel; Future  -     Hemoglobin A1C; Future    Essential hypertension/acceptable control        -     Continue: Med TX 1-Avapro 300 mg QD, 2-Coreg 6.25 mg BID  -     Comprehensive Metabolic Panel; Future    Hyperlipidemia, unspecified hyperlipidemia type/controlled        -     Continue: Med Tx 1- Zocor 40mg QD, 2-Fenofibrate 160mg QD  -     Comprehensive Metabolic Panel; Future  -     Lipid Panel; Future    Stage 3b chronic kidney disease/stable        -     NO NSAIDS        -     Push intake of water/liquids    Osteoporosis, unspecified osteoporosis type, unspecified pathological fracture presence/on Prolia Tx        -    Follow along as per Endocrinology/Dr Medina    Encounter for screening mammogram for malignant neoplasm of breast  -     Mammo Digital Screening Bilat w/ Lang; Future    Colon cancer screening/No C-scope to dater with pt deferring on such for now  -     Fecal Immunochemical Test (iFOBT); Future    Health Maintenance        -     RTC x 6 months with 1 week prior fasting lab or be seen sooner prn

## 2024-05-28 ENCOUNTER — OFFICE VISIT (OUTPATIENT)
Dept: INTERNAL MEDICINE | Facility: CLINIC | Age: 69
End: 2024-05-28
Payer: MEDICARE

## 2024-05-28 VITALS
HEART RATE: 83 BPM | SYSTOLIC BLOOD PRESSURE: 130 MMHG | OXYGEN SATURATION: 99 % | HEIGHT: 59 IN | DIASTOLIC BLOOD PRESSURE: 70 MMHG | WEIGHT: 114.44 LBS | TEMPERATURE: 97 F | BODY MASS INDEX: 23.07 KG/M2

## 2024-05-28 DIAGNOSIS — I10 ESSENTIAL HYPERTENSION: ICD-10-CM

## 2024-05-28 DIAGNOSIS — M81.0 OSTEOPOROSIS, UNSPECIFIED OSTEOPOROSIS TYPE, UNSPECIFIED PATHOLOGICAL FRACTURE PRESENCE: ICD-10-CM

## 2024-05-28 DIAGNOSIS — N18.32 STAGE 3B CHRONIC KIDNEY DISEASE: ICD-10-CM

## 2024-05-28 DIAGNOSIS — E78.5 HYPERLIPIDEMIA, UNSPECIFIED HYPERLIPIDEMIA TYPE: ICD-10-CM

## 2024-05-28 DIAGNOSIS — N18.32 TYPE 2 DIABETES MELLITUS WITH STAGE 3B CHRONIC KIDNEY DISEASE, WITHOUT LONG-TERM CURRENT USE OF INSULIN: Primary | ICD-10-CM

## 2024-05-28 DIAGNOSIS — E11.22 TYPE 2 DIABETES MELLITUS WITH STAGE 3B CHRONIC KIDNEY DISEASE, WITHOUT LONG-TERM CURRENT USE OF INSULIN: Primary | ICD-10-CM

## 2024-05-28 DIAGNOSIS — Z12.31 ENCOUNTER FOR SCREENING MAMMOGRAM FOR MALIGNANT NEOPLASM OF BREAST: ICD-10-CM

## 2024-05-28 DIAGNOSIS — Z12.11 COLON CANCER SCREENING: ICD-10-CM

## 2024-05-28 PROCEDURE — 3061F NEG MICROALBUMINURIA REV: CPT | Mod: CPTII,S$GLB,, | Performed by: INTERNAL MEDICINE

## 2024-05-28 PROCEDURE — 3078F DIAST BP <80 MM HG: CPT | Mod: CPTII,S$GLB,, | Performed by: INTERNAL MEDICINE

## 2024-05-28 PROCEDURE — 3008F BODY MASS INDEX DOCD: CPT | Mod: CPTII,S$GLB,, | Performed by: INTERNAL MEDICINE

## 2024-05-28 PROCEDURE — 1101F PT FALLS ASSESS-DOCD LE1/YR: CPT | Mod: CPTII,S$GLB,, | Performed by: INTERNAL MEDICINE

## 2024-05-28 PROCEDURE — 99214 OFFICE O/P EST MOD 30 MIN: CPT | Mod: S$GLB,,, | Performed by: INTERNAL MEDICINE

## 2024-05-28 PROCEDURE — 3044F HG A1C LEVEL LT 7.0%: CPT | Mod: CPTII,S$GLB,, | Performed by: INTERNAL MEDICINE

## 2024-05-28 PROCEDURE — 1159F MED LIST DOCD IN RCRD: CPT | Mod: CPTII,S$GLB,, | Performed by: INTERNAL MEDICINE

## 2024-05-28 PROCEDURE — 3066F NEPHROPATHY DOC TX: CPT | Mod: CPTII,S$GLB,, | Performed by: INTERNAL MEDICINE

## 2024-05-28 PROCEDURE — 3075F SYST BP GE 130 - 139MM HG: CPT | Mod: CPTII,S$GLB,, | Performed by: INTERNAL MEDICINE

## 2024-05-28 PROCEDURE — 4010F ACE/ARB THERAPY RXD/TAKEN: CPT | Mod: CPTII,S$GLB,, | Performed by: INTERNAL MEDICINE

## 2024-05-28 PROCEDURE — 3288F FALL RISK ASSESSMENT DOCD: CPT | Mod: CPTII,S$GLB,, | Performed by: INTERNAL MEDICINE

## 2024-05-28 PROCEDURE — 1125F AMNT PAIN NOTED PAIN PRSNT: CPT | Mod: CPTII,S$GLB,, | Performed by: INTERNAL MEDICINE

## 2024-05-28 PROCEDURE — 99999 PR PBB SHADOW E&M-EST. PATIENT-LVL IV: CPT | Mod: PBBFAC,,, | Performed by: INTERNAL MEDICINE

## 2024-05-28 NOTE — PATIENT INSTRUCTIONS
For Constipation:  #1-FiberCon at 1 tab daily with large 8 oz liquid/water    Reminder:  #1-Get RSV Vaccine at the Pharmacy  #2-Consider getting the Shingles vaccine at [facility]

## 2024-07-09 DIAGNOSIS — I10 ESSENTIAL HYPERTENSION: ICD-10-CM

## 2024-07-09 NOTE — TELEPHONE ENCOUNTER
----- Message from Zuly Iglesias sent at 7/9/2024 12:21 PM CDT -----  Contact: [phone]  Pt is requesting to have all her medications transferred to   [id number] - [place], [place] - [address] AT [facility] [address]  [address] [address]  Phone: [phone] Fax: [phone] due to fraud.     Per pt, she is requesting an RX refill or new RX.    Is this a refill or new RX:  refill     RX name and strength (copy/paste from chart):  irbesartan (AVAPRO) 300 MG tablet 90 tablet    Is this a 30 day or 90 day RX:     Pharmacy name and phone # (copy/paste from chart):    [id number] - [place], [place] - [address] AT [facility] [address]  [address] [address]  Phone: [phone] Fax: [phone]  ,    The doctors have asked that we provide their patients with the following 2 reminders -- prescription refills can take up to 72 hours, and a friendly reminder that in the future you can use your [facility] account to request refills: pt is aware       Thank you

## 2024-07-09 NOTE — TELEPHONE ENCOUNTER
No care due was identified.  [facility] Embedded Care Due Messages. Reference number: [id number].   7/09/2024 1:58:39 PM CDT

## 2024-07-10 RX ORDER — IRBESARTAN 300 MG/1
300 TABLET ORAL NIGHTLY
Qty: 90 TABLET | Refills: 3 | Status: SHIPPED | OUTPATIENT
Start: 2024-07-10 | End: 2025-07-05

## 2024-07-10 NOTE — TELEPHONE ENCOUNTER
Refill Decision Note   Ebony Angel  is requesting a refill authorization.  Brief Assessment and Rationale for Refill:  Approve     Medication Therapy Plan:        Comments:     Note composed:10:31 AM 07/10/2024

## 2024-07-23 ENCOUNTER — HOSPITAL ENCOUNTER (OUTPATIENT)
Dept: RADIOLOGY | Facility: HOSPITAL | Age: 69
Discharge: HOME | End: 2024-07-23
Attending: INTERNAL MEDICINE
Payer: MEDICARE

## 2024-07-23 DIAGNOSIS — Z12.31 ENCOUNTER FOR SCREENING MAMMOGRAM FOR MALIGNANT NEOPLASM OF BREAST: ICD-10-CM

## 2024-07-23 PROCEDURE — 77067 SCR MAMMO BI INCL CAD: CPT | Mod: TC

## 2024-07-29 DIAGNOSIS — E11.9 CONTROLLED TYPE 2 DIABETES MELLITUS WITHOUT COMPLICATION, WITHOUT LONG-TERM CURRENT USE OF INSULIN: ICD-10-CM

## 2024-07-29 RX ORDER — METFORMIN HYDROCHLORIDE 500 MG/1
500 TABLET ORAL 2 TIMES DAILY WITH MEALS
Qty: 180 TABLET | Refills: 1 | Status: SHIPPED | OUTPATIENT
Start: 2024-07-29

## 2024-07-29 NOTE — TELEPHONE ENCOUNTER
No care due was identified.  [facility] Embedded Care Due Messages. Reference number: [id number].   7/29/2024 7:19:29 AM CDT

## 2024-07-29 NOTE — TELEPHONE ENCOUNTER
Refill Decision Note   Ebony Jeanne  is requesting a refill authorization.  Brief Assessment and Rationale for Refill:  Approve     Medication Therapy Plan:  eGFR reviewed. Current dosage within recommendatons. Renal dose adjustment not required at this time.      Comments:     Note composed:7:37 AM 07/29/2024

## 2024-07-30 ENCOUNTER — TELEPHONE (OUTPATIENT)
Dept: RADIOLOGY | Facility: HOSPITAL | Age: 69
End: 2024-07-30
Payer: MEDICARE

## 2024-07-30 NOTE — TELEPHONE ENCOUNTER
Spoke with patient and explained mammogram findings.Patient expressed understanding of results. Patient scheduled abnormal mammogram follow up appointment at [facility] on 8/7/2024.

## 2024-08-07 ENCOUNTER — HOSPITAL ENCOUNTER (OUTPATIENT)
Dept: RADIOLOGY | Facility: HOSPITAL | Age: 69
Discharge: HOME | End: 2024-08-07
Attending: INTERNAL MEDICINE
Payer: MEDICARE

## 2024-08-07 DIAGNOSIS — R92.8 ABNORMAL FINDING ON BREAST IMAGING: ICD-10-CM

## 2024-08-07 PROCEDURE — 77061 BREAST TOMOSYNTHESIS UNI: CPT | Mod: TC,LT

## 2024-08-07 PROCEDURE — 77061 BREAST TOMOSYNTHESIS UNI: CPT | Mod: 26,LT,, | Performed by: RADIOLOGY

## 2024-08-07 PROCEDURE — 77065 DX MAMMO INCL CAD UNI: CPT | Mod: 26,LT,, | Performed by: RADIOLOGY

## 2024-08-29 ENCOUNTER — OFFICE VISIT (OUTPATIENT)
Dept: ENDOCRINOLOGY | Facility: CLINIC | Age: 69
End: 2024-08-29
Payer: MEDICARE

## 2024-08-29 VITALS
BODY MASS INDEX: 23.05 KG/M2 | DIASTOLIC BLOOD PRESSURE: 98 MMHG | SYSTOLIC BLOOD PRESSURE: 152 MMHG | HEART RATE: 93 BPM | WEIGHT: 112.19 LBS

## 2024-08-29 DIAGNOSIS — N18.31 STAGE 3A CHRONIC KIDNEY DISEASE: ICD-10-CM

## 2024-08-29 DIAGNOSIS — M81.0 AGE-RELATED OSTEOPOROSIS WITHOUT CURRENT PATHOLOGICAL FRACTURE: Primary | ICD-10-CM

## 2024-08-29 DIAGNOSIS — N18.31 TYPE 2 DIABETES MELLITUS WITH STAGE 3A CHRONIC KIDNEY DISEASE, WITHOUT LONG-TERM CURRENT USE OF INSULIN: ICD-10-CM

## 2024-08-29 DIAGNOSIS — E11.22 TYPE 2 DIABETES MELLITUS WITH STAGE 3A CHRONIC KIDNEY DISEASE, WITHOUT LONG-TERM CURRENT USE OF INSULIN: ICD-10-CM

## 2024-08-29 PROCEDURE — 1101F PT FALLS ASSESS-DOCD LE1/YR: CPT | Mod: CPTII,S$GLB,, | Performed by: HOSPITALIST

## 2024-08-29 PROCEDURE — 1160F RVW MEDS BY RX/DR IN RCRD: CPT | Mod: CPTII,S$GLB,, | Performed by: HOSPITALIST

## 2024-08-29 PROCEDURE — 3044F HG A1C LEVEL LT 7.0%: CPT | Mod: CPTII,S$GLB,, | Performed by: HOSPITALIST

## 2024-08-29 PROCEDURE — 3061F NEG MICROALBUMINURIA REV: CPT | Mod: CPTII,S$GLB,, | Performed by: HOSPITALIST

## 2024-08-29 PROCEDURE — 3066F NEPHROPATHY DOC TX: CPT | Mod: CPTII,S$GLB,, | Performed by: HOSPITALIST

## 2024-08-29 PROCEDURE — 4010F ACE/ARB THERAPY RXD/TAKEN: CPT | Mod: CPTII,S$GLB,, | Performed by: HOSPITALIST

## 2024-08-29 PROCEDURE — 3077F SYST BP >= 140 MM HG: CPT | Mod: CPTII,S$GLB,, | Performed by: HOSPITALIST

## 2024-08-29 PROCEDURE — 99214 OFFICE O/P EST MOD 30 MIN: CPT | Mod: S$GLB,,, | Performed by: HOSPITALIST

## 2024-08-29 PROCEDURE — 99999 PR PBB SHADOW E&M-EST. PATIENT-LVL III: CPT | Mod: PBBFAC,,, | Performed by: HOSPITALIST

## 2024-08-29 PROCEDURE — 1159F MED LIST DOCD IN RCRD: CPT | Mod: CPTII,S$GLB,, | Performed by: HOSPITALIST

## 2024-08-29 PROCEDURE — 3008F BODY MASS INDEX DOCD: CPT | Mod: CPTII,S$GLB,, | Performed by: HOSPITALIST

## 2024-08-29 PROCEDURE — 3288F FALL RISK ASSESSMENT DOCD: CPT | Mod: CPTII,S$GLB,, | Performed by: HOSPITALIST

## 2024-08-29 PROCEDURE — 3080F DIAST BP >= 90 MM HG: CPT | Mod: CPTII,S$GLB,, | Performed by: HOSPITALIST

## 2024-08-29 NOTE — ASSESSMENT & PLAN NOTE
- Patient with Osteoporosis as been treated in the past, chronically >10 years, osteopenia with high FRAX  - Was on on Fosamax > stop it herself many years ago, Does have CKD stage IIIA  - Current medication: Prolia SC injection every 6 months. 1st injection: 9/20/2023, injections so far 2 out of 4  - Vit D intake?/Calcium intake? 600 mg calcium plus vitamin-D    Plan  - treatment plan renew for 2024: Prolia injection 9/20/2024 message sent to infusion center today to schedule patient Prolia injection  - advised patient if she needs any invasive dental work planned, to be done 2 months after her next Prolia injection, which gives her time to heal, contact information given to patient if there are issues with her dentist  - continue supplementation:  calcium and vitamin D3 2000 IU daily  - Routine lab work>> 6 months: check CMP, vitamin-D  - Next DXA: 2 years, prefer after 4 injections of Prolia  - Fall precautions/Exercise regimen: advised, (weight bearing exercises: handout given)  - Routine dental health screening advised, cleaning every 6 months, early March with bloodwork prior

## 2024-08-29 NOTE — PROGRESS NOTES
"Subjective:      Patient ID: Ebony Angel is a 69 y.o. female presented to [facility] on 8/29/2024.  Chief complaint:  Osteoporosis    History of Present illness: Ebony Angel is a 69 y.o. female here for osteoporosis  Other significant past medical history: HTN, CKD3a    Interval history:  Here for follow-up osteoporosis.  Started on SC Prolia, tolerated well.  No issue   No recent falls.  Sees dentist regularly,  possible extraction in the future  Will need Prolia in 6 months, next injection around 9/20/2024, will need to be schedule  Walking regiment, recent moved to [place]  Reported on calcium and vitamin-D supplements      1) Osteoporosis  - Diagnosis on DXA in chronically >10 years, osteopenia with high FRAX  - Was on on Fosamax > stop it herself many years ago  - Does have CKD stage IIIA  - Current medication: Prolia SC injection every 6 months. 1st injection: 9/20/2023, so far has gotten 2 injections  - Vit D intake?/Calcium intake? 600 mg calcium, Vit D3 2000 ui daily  - History of falls over the last 2 years: no  - History of fractures to wrist, hip or spine: no    Osteoporosis Risk Factor Assessment:  - Family history of osteoporosis: yes, Mom with Osteoporosis   - Family history of fractures of hip: yes, both mom: fell broke pelvic 2013  and dad: fall broke hip 2017  - History of loss of height of more than 1 1/2 to 2 inches: no, measured height in clinic: 4'10.5"  - Age of menopause: early 40s, no Hysterectomy, No: use of HRT  - Tobacco use, including use in the past:  no   - Patient lives with:  Family at home. Without Stairs  - Walking gait:  Unsteady occasionally if she turns quickly  - Weight bearing exercise?   no  specific     Medical hx  - Dental work planned? no, sees dentist regularly, routine cleaning  - Chronic kidney disease: yes, CKD stage 3b  - History of Hyperparathyrodism, no  - History of kidney stones, no  - History of cancer or " malignancy, history of malignancy, or prior radiation treatment , no    Recent DXA:   09/29/2022  Lumbar spine (L1-L4):  BMD is 1.234 g/cm2, T-score is 1.7, and Z-score is 3.6.  Total hip:  BMD is 0.725 g/cm2, T-score is -1.8, and Z-score is -0.4.  Femoral neck: BMD is 0.587 g/cm2, T-score is -2.4, and Z-score is -0.7.  Distal 1/3 radius:  Not applicable     FRAX:  21% risk of a major osteoporotic fracture in the next 10 years.  4.1% risk of hip fracture in the next 10 years.     Impression:  *Osteoporosis based on T-score between -1.0 and -2.5 and elevated risk based on FRAX    Lab work reviewed  Lab Results   Component Value Date    JWAAAIOT26MM 74 08/22/2024    BVLLSTTH12OP 66 05/21/2024    WRLIVAFO61WV 63 02/22/2024    CALCIUM 9.8 08/22/2024    CALCIUM 9.9 05/21/2024    CALCIUM 10.1 02/22/2024    ALKPHOS 32 (L) 05/21/2024    ALKPHOS 31 (L) 02/22/2024    ALKPHOS 31 (L) 11/22/2023    TSH 1.275 05/21/2024       2) Type 2 Diabetes  -  Well controlled   - A1C 6.4%  - On metformin  500 mg twice day     Lab Results   Component Value Date    HGBA1C 6.6 (H) 05/21/2024     Reviewed past surgical, medical, family, social history and updated as appropriate.  Review of Systems: see HPI above  Objective:   BP (!) 152/98   Pulse 93   Wt 50.9 kg (112 lb 3.2 oz)   BMI 23.05 kg/m²   Body mass index is 23.05 kg/m².  Vital signs reviewed    Physical Exam  Vitals and nursing note reviewed.   Constitutional:       Appearance: Normal appearance. She is well-developed. She is not ill-appearing.   Neck:      Thyroid: No thyromegaly.   Pulmonary:      Effort: Pulmonary effort is normal. No respiratory distress.   Musculoskeletal:         General: Normal range of motion.      Cervical back: Normal range of motion.   Neurological:      General: No focal deficit present.      Mental Status: She is alert. Mental status is at baseline.   Psychiatric:         Mood and Affect: Mood normal.         Behavior: Behavior normal.     Labs  reviewed:  See results in subjective  Lab Results   Component Value Date    HGBA1C 6.6 (H) 05/21/2024     Lab Results   Component Value Date    CHOL 129 05/21/2024    HDL 43 05/21/2024    LDLCALC 66.2 05/21/2024    TRIG 99 05/21/2024    CHOLHDL 33.3 05/21/2024     Lab Results   Component Value Date     (L) 08/22/2024    K 4.3 08/22/2024     08/22/2024    CO2 21 (L) 08/22/2024     (H) 08/22/2024    BUN 19 08/22/2024    CREATININE 1.3 08/22/2024    CALCIUM 9.8 08/22/2024    PROT 6.9 05/21/2024    ALBUMIN 3.8 05/21/2024    BILITOT 0.5 05/21/2024    ALKPHOS 32 (L) 05/21/2024    AST 19 05/21/2024    ALT 12 05/21/2024    ANIONGAP 10 08/22/2024    ESTGFRAFRICA 49.4 (A) 11/11/2021    EGFRNONAA 42.9 (A) 11/11/2021    TSH 1.275 05/21/2024    TBDUBLOI70BG 74 08/22/2024     Assessment     1. Age-related osteoporosis without current pathological fracture  RENAL FUNCTION PANEL    CANCELED: RENAL FUNCTION PANEL      2. Stage 3a chronic kidney disease        3. Type 2 diabetes mellitus with stage 3a chronic kidney disease, without long-term current use of insulin          Plan     Age-related osteoporosis without current pathological fracture  - Patient with Osteoporosis as been treated in the past, chronically >10 years, osteopenia with high FRAX  - Was on on Fosamax > stop it herself many years ago, Does have CKD stage IIIA  - Current medication: Prolia SC injection every 6 months. 1st injection: 9/20/2023, injections so far 2 out of 4  - Vit D intake?/Calcium intake? 600 mg calcium plus vitamin-D    Plan  - treatment plan renew for 2024: Prolia injection 9/20/2024 message sent to infusion center today to schedule patient Prolia injection  - advised patient if she needs any invasive dental work planned, to be done 2 months after her next Prolia injection, which gives her time to heal, contact information given to patient if there are issues with her dentist  - continue supplementation:  calcium and vitamin D3  2000 IU daily  - Routine lab work>> 6 months: check CMP, vitamin-D  - Next DXA: 2 years, prefer after 4 injections of Prolia  - Fall precautions/Exercise regimen: advised, (weight bearing exercises: handout given)  - Routine dental health screening advised, cleaning every 6 months, early March with bloodwork prior    Stage 3a chronic kidney disease  - Renal function reviewed on lab work today, stable   - continue routine monitoring    Type 2 diabetes mellitus with stage 3a chronic kidney disease, without long-term current use of insulin  -diabetes controlled managed by PCP    Advised patient to follow up with PCP for routine health maintenance care.   RTC in 6 months    Willis Medina M.D.  Endocrinology  [facility]  8/29/2024      Disclaimer: This note has been generated in part with the use of voice-recognition software. There may be typographical errors that have been missed during proof-reading.

## 2024-08-30 ENCOUNTER — TELEPHONE (OUTPATIENT)
Dept: ENDOCRINOLOGY | Facility: CLINIC | Age: 69
End: 2024-08-30
Payer: MEDICARE

## 2024-08-30 NOTE — TELEPHONE ENCOUNTER
----- Message from Noreen Hughes RN sent at 8/29/2024  1:37 PM CDT -----  She is scheduled at 215 pm on 9/20.  ----- Message -----  From: Willis Medina MD  Sent: 8/29/2024   1:33 PM CDT  To: Noreen Hughes RN; KATIA Mccloud, please schedule this patient, her Prolia injections around 9/20/2023 or earlier    Alona: can you call patient to let her know when we have date and time, thanks!

## 2024-09-03 DIAGNOSIS — I10 ESSENTIAL HYPERTENSION: ICD-10-CM

## 2024-09-03 NOTE — TELEPHONE ENCOUNTER
Care Due:                  Date            Visit Type   Department     Provider  --------------------------------------------------------------------------------                                EP -                              PRIMARY      [facility] INTERNAL  Last Visit: 05-      CARE ([facility])   [facility]       [place]   [facility] [place]                              [facility]                              PRIMARY      [facility] INTERNAL  Next Visit: 11-      CARE ([facility])   [facility]                                                            Last  Test          Frequency    Reason                     Performed    Due Date  --------------------------------------------------------------------------------    HBA1C.......  6 months...  metFORMIN................  05- 11-    [facility] Embedded Care Due Messages. Reference number: [id number].   9/03/2024 8:11:11 AM CDT

## 2024-09-04 RX ORDER — IRBESARTAN 300 MG/1
TABLET ORAL
Qty: 90 TABLET | Refills: 2 | Status: SHIPPED | OUTPATIENT
Start: 2024-09-04

## 2024-09-04 NOTE — TELEPHONE ENCOUNTER
Refill Routing Note   Medication(s) are not appropriate for processing by [facility] for the following reason(s):        Required vitals abnormal    [facility] action(s):  Defer   Requires labs : Yes             Appointments  past 12m or future 3m with PCP    Date Provider   Last Visit   5/28/2024 Iban Martinez MD   Next Visit   11/26/2024 Iban Martinez MD   ED visits in past 90 days: 0        Note composed:10:48 PM 09/03/2024

## 2024-09-20 ENCOUNTER — INFUSION (OUTPATIENT)
Dept: INFUSION THERAPY | Facility: HOSPITAL | Age: 69
End: 2024-09-20
Payer: MEDICARE

## 2024-09-20 DIAGNOSIS — M81.0 AGE-RELATED OSTEOPOROSIS WITHOUT CURRENT PATHOLOGICAL FRACTURE: ICD-10-CM

## 2024-09-20 DIAGNOSIS — N18.31 STAGE 3A CHRONIC KIDNEY DISEASE: Primary | ICD-10-CM

## 2024-09-20 PROCEDURE — 96372 THER/PROPH/DIAG INJ SC/IM: CPT

## 2024-09-20 PROCEDURE — 63600175 PHARM REV CODE 636 W HCPCS: Mod: JZ,JG | Performed by: HOSPITALIST

## 2024-09-20 RX ADMIN — DENOSUMAB 60 MG: 60 INJECTION SUBCUTANEOUS at 02:09

## 2024-09-20 NOTE — NURSING
Patient tolerated Prolia injection well, Calcium 9.8. Patient denies jaw pain and any dental work recently. D/C with next appt scheduled.

## 2024-10-19 DIAGNOSIS — I10 HYPERTENSION, ESSENTIAL: ICD-10-CM

## 2024-10-19 DIAGNOSIS — E78.5 HYPERLIPIDEMIA, UNSPECIFIED HYPERLIPIDEMIA TYPE: ICD-10-CM

## 2024-10-19 NOTE — TELEPHONE ENCOUNTER
No care due was identified.  [facility] Embedded Care Due Messages. Reference number: [id number].   10/19/2024 8:49:16 AM CDT

## 2024-10-20 RX ORDER — FENOFIBRATE 160 MG/1
160 TABLET ORAL
Qty: 90 TABLET | Refills: 1 | Status: SHIPPED | OUTPATIENT
Start: 2024-10-20

## 2024-10-20 RX ORDER — SIMVASTATIN 40 MG/1
TABLET, FILM COATED ORAL
Qty: 90 TABLET | Refills: 1 | Status: SHIPPED | OUTPATIENT
Start: 2024-10-20

## 2024-10-20 NOTE — TELEPHONE ENCOUNTER
Refill Routing Note   Medication(s) are not appropriate for processing by [facility] for the following reason(s):        Required vitals abnormal    [facility] action(s):  Approve  Defer             Appointments  past 12m or future 3m with PCP    Date Provider   Last Visit   5/28/2024 Iban Martinez MD   Next Visit   11/26/2024 Iban Martinez MD   ED visits in past 90 days: 0        Note composed:11:25 AM 10/20/2024

## 2024-10-21 RX ORDER — CARVEDILOL 6.25 MG/1
6.25 TABLET ORAL 2 TIMES DAILY WITH MEALS
Qty: 180 TABLET | Refills: 1 | Status: SHIPPED | OUTPATIENT
Start: 2024-10-21

## 2024-10-23 DIAGNOSIS — Z78.0 MENOPAUSE: ICD-10-CM

## 2024-10-31 DIAGNOSIS — Z78.0 MENOPAUSE: ICD-10-CM

## 2024-11-06 DIAGNOSIS — Z78.0 MENOPAUSE: ICD-10-CM

## 2024-11-13 DIAGNOSIS — Z78.0 MENOPAUSE: ICD-10-CM

## 2024-11-20 DIAGNOSIS — Z78.0 MENOPAUSE: ICD-10-CM

## 2024-11-25 NOTE — PROGRESS NOTES
Subjective:       Patient ID: Ebony Angel is a 69 y.o. female.    Chief Complaint:   Follow-up, Hyperlipidemia, Diabetes, and Chronic Kidney Disease    HPI: Mrs Cooper presents for HTN,DM, and HLD    HTN:Med TX 1-Avapro 300 mg QD, 2-Coreg 6.25 mg BID        Home BPs:None recently  DM: Med Tx 1- Glucophage 500mg BID        Accuchecks:  None recently     HLD: Med Tx 1- Zocor 40mg QD, 2-Fenofibrate 160mg QD     Osteoporosis: s/p Endocrine Eval/Dr Medina(8/23) s/p Last Bone Density(9/22) + Osteoporosis in presence of CKD Stage 3;Prolia recommended per Dr Medina and started 10/23 with Last dose given 9/20/24; Dexa after 4th Prolia per Dr Medina    History of Present Illness    CHIEF COMPLAINT:  Ms. Angel presents today for follow-up.    MEDICATIONS:  Current medications include Simvastatin 40 mg daily, Fenofibrate 160 mg daily, Metformin twice daily, Irbesartan 300 mg daily, and Carvedilol twice daily. She continues Prolia treatment for osteoporosis management, with one more injection scheduled before her next bone density scan. She reports the treatment seems effective.    CARDIOVASCULAR:  She checks her blood pressure at home occasionally, noting fluctuating readings. She can sometimes sense when her blood pressure is elevated.    DIABETES:  She does not regularly check glucose at home and denies experiencing any symptoms of hypoglycemia, such as weakness or dizziness.    PREVENTIVE CARE:  She completed her mammogram, which required additional imaging but ultimately showed normal results. A follow-up mammogram is scheduled for next year.    GASTROINTESTINAL:  She experiences occasional constipation, which she manages by drinking water. She recently had an episode of diarrhea, possibly attributed to dietary factors (specifically mentioning hot tamales consumption a few days prior).    SINUS ISSUES:  She reports sinus problems, primarily at night and predominantly on one side, with pressure and occasional  localized headaches. Some sneezing is noted, but no itching or watery eyes. Symptoms have worsened since moving to a new area. She denies fever, colored nasal discharge, or daytime post-nasal drip. She attributes the change in symptoms possibly to environmental factors, such as increased chlorine in the local water supply.      ROS:  Constitutional: -fevers, -dizziness  Head: +headaches  ENT: +nasal congestion  Gastrointestinal: +diarrhea, +constipation           Past Medical, Surgical, Social History: Please see as stated in Epic chart which has been reviewed.    Current Outpatient Medications   Medication Sig Dispense Refill    brimonidine 0.15 % OPTH DROP (ALPHAGAN) 0.15 % ophthalmic solution 1 drop 2 (two) times a day.      cetirizine (ZYRTEC) 10 MG tablet TAKE ONE TABLET BY MOUTH ONCE DAILY AS NEEDED FOR ALLERGY SYMPTOMS      ferrous sulfate, dried (SLOW FE) 160 mg (50 mg iron) TbSR Take 1 tablet (160 mg total) by mouth once daily. 90 tablet 2    latanoprost 0.005 % ophthalmic solution 1 drop once daily.      carvediloL (COREG) 6.25 MG tablet Take 1 tablet (6.25 mg total) by mouth 2 (two) times daily with meals. 180 tablet 1    cetirizine (ZYRTEC) 10 MG tablet Take 1 tablet (10 mg total) by mouth once daily. 30 tablet 6    fenofibrate 160 MG Tab Take 1 tablet (160 mg total) by mouth once daily. 90 tablet 1    irbesartan (AVAPRO) 300 MG tablet Take 1 tablet (300 mg total) by mouth once daily. 90 tablet 1    metFORMIN (GLUCOPHAGE) 500 MG tablet Take 1 tablet (500 mg total) by mouth 2 (two) times daily with meals. 180 tablet 1    simvastatin (ZOCOR) 40 MG tablet Take 1 tablet (40 mg total) by mouth every evening. 90 tablet 1     No current facility-administered medications for this visit.       Review of Systems   HENT:  Positive for congestion, sinus pressure and sneezing.         + Sinus congestion sx x  6-12 months   Gastrointestinal:  Positive for constipation. Negative for abdominal pain and blood in stool.         + Rare constipation   All other systems reviewed and are negative.      Objective:      Lab Results   Component Value Date    WBC 6.53 05/21/2024    HGB 11.7 (L) 05/21/2024    HCT 35.5 (L) 05/21/2024     05/21/2024    CHOL 124 11/22/2024    TRIG 93 11/22/2024    HDL 43 11/22/2024    ALT 14 11/22/2024    AST 19 11/22/2024     (L) 11/22/2024    K 4.1 11/22/2024     11/22/2024    CREATININE 1.3 11/22/2024    BUN 20 11/22/2024    CO2 21 (L) 11/22/2024    TSH 1.275 05/21/2024    HGBA1C 6.3 (H) 11/22/2024     Physical Exam  Vitals reviewed.   Constitutional:       Appearance: Normal appearance.   HENT:      Head: Normocephalic and atraumatic.      Mouth/Throat:      Mouth: Mucous membranes are dry.      Pharynx: No posterior oropharyngeal erythema.   Cardiovascular:      Rate and Rhythm: Normal rate and regular rhythm.   Pulmonary:      Effort: Pulmonary effort is normal.      Breath sounds: Normal breath sounds. No wheezing.   Chest:      Chest wall: No tenderness.   Abdominal:      General: Abdomen is flat. Bowel sounds are normal.      Palpations: Abdomen is soft. There is no mass.      Tenderness: There is no abdominal tenderness.   Musculoskeletal:         General: No swelling.      Cervical back: Normal range of motion and neck supple. No muscular tenderness.      Right lower leg: No edema.      Left lower leg: No edema.   Lymphadenopathy:      Cervical: No cervical adenopathy.   Skin:     General: Skin is warm and dry.   Neurological:      General: No focal deficit present.      Mental Status: She is alert.       Breasts:  On gross visualization, normal; on palpation no masses tenderness or nipple discharge    Protective Sensation (w/ 10 gram monofilament):  Right: Intact  Left: Intact    Visual Inspection:  Normal -  Bilateral    Pedal Pulses:   Right: Present  Left: Present    Posterior Tibialis Pulses:   Right:Diminished  Left: Diminished     Health Maintenance:  Last PAP: No result  "found    Last Colonoscopy:  None to Date    Last Mammogram: 7/23/24,8/7/24(Diagnostic F/U negative)-Negative    Last Density: 9/21/22: + Osteoporosis      Vital Signs  Temp: 97.4 °F (36.3 °C)  Temp Source: Other (see comments)  Pulse: 88  Resp: 18  SpO2: 98 %  BP: 130/80  BP Location: Right arm  Patient Position: Sitting  Pain Score: 0-No pain  Height and Weight  Height: 4' 10" (147.3 cm)  Weight: 50.1 kg (110 lb 9 oz)  BSA (Calculated - sq m): 1.43 sq meters  BMI (Calculated): 23.1  Weight in (lb) to have BMI = 25: 119.4    Assessment:       1. Essential hypertension    2. Stage 3b chronic kidney disease    3. Type 2 diabetes mellitus with stage 3b chronic kidney disease, without long-term current use of insulin    4. Hyperlipidemia, unspecified hyperlipidemia type    5. Allergic rhinitis, unspecified seasonality, unspecified trigger    6. Osteoporosis, unspecified osteoporosis type, unspecified pathological fracture presence    7. Colon cancer screening    8. Need for vaccination        Plan:     Health Maintenance   Topic Date Due    Colorectal Cancer Screening  Never done    Shingles Vaccine (1 of 2) Never done    DEXA Scan  09/21/2024    Eye Exam  01/04/2025    Hemoglobin A1c  05/22/2025    Mammogram  08/07/2025    Lipid Panel  11/22/2025    Low Dose Statin  11/26/2025    Foot Exam  11/26/2025    TETANUS VACCINE  07/26/2032    Hepatitis C Screening  Completed        Ebony Hale" was seen today for follow-up, hyperlipidemia, diabetes and chronic kidney disease.    Diagnoses and all orders for this visit:    Essential hypertension/controlled  -     carvediloL (COREG) 6.25 MG tablet; Take 1 tablet (6.25 mg total) by mouth 2 (two) times daily with meals.  -     irbesartan (AVAPRO) 300 MG tablet; Take 1 tablet (300 mg total) by mouth once daily.  -     Comprehensive Metabolic Panel; Future  -     TSH; Future  -     CBC Auto Differential; Future    Stage 3b chronic kidney disease        -     No NSAIDS  -     " Comprehensive Metabolic Panel; Future    Type 2 diabetes mellitus with stage 3b chronic kidney disease, without long-term current use of insulin/controlled  -     metFORMIN (GLUCOPHAGE) 500 MG tablet; Take 1 tablet (500 mg total) by mouth 2 (two) times daily with meals.  -     Comprehensive Metabolic Panel; Future  -     Hemoglobin A1C; Future    Hyperlipidemia, unspecified hyperlipidemia type/controlled  -     fenofibrate 160 MG Tab; Take 1 tablet (160 mg total) by mouth once daily.  -     simvastatin (ZOCOR) 40 MG tablet; Take 1 tablet (40 mg total) by mouth every evening.  -     Comprehensive Metabolic Panel; Future  -     Lipid Panel; Future    Allergic rhinitis, unspecified seasonality, unspecified trigger        -     Recommend a trial of Zyrtec 10mg QHS x 4-6 weeks and as needed    Osteoporosis, unspecified osteoporosis type, unspecified pathological fracture presence/on Prolia Q6 months        -    Follow along as per Endocrine/Dr Medina    Colon cancer screening  -     Cologuard Screening (Multitarget Stool DNA); Future  -     Cologuard Screening (Multitarget Stool DNA)    Need for vaccination  -     Influenza - Trivalent (Adjuvanted)        Assessment & Plan    COLON CANCER SCREENING:   Emphasized the importance of colonoscopy for colon cancer screening.   Discussed Cologuard as an alternative screening method for colon cancer.   Cologuard test ordered for colon cancer screening.   Contact the office if patient changes mind about colonoscopy.    HYPERTENSION:   Ms. Angel to check blood pressure at home a few times per month and record readings.   Continued irbesartan 300 mg daily, and carvedilol twice daily.    HYPERLIPIDEMIA:   Continued simvastatin 40 mg daily, fenofibrate 160 mg daily.    TYPE 2 DIABETES MELLITUS:   Continued metformin (glucophage) 1 tablet in the morning and 1 tablet in the evening.    CONSTIPATION:   Recommend increasing water intake to help with occasional  constipation.    SINUSITIS AND ALLERGIC RHINITIS:   Informed about the potential link between local environmental factors (chlorine in water, oil refineries) and sinus symptoms.    FOLLOW-UP:   Follow up for mammogram, specifying a diagnostic mammogram.   - RTC x 6 months with 1 week prior fasting lab          This note was generated with the assistance of ambient listening technology. Verbal consent was obtained by the patient and accompanying visitor(s) for the recording of patient appointment to facilitate this note. I attest to having reviewed and edited the generated note for accuracy, though some syntax or spelling errors may persist. Please contact the author of this note for any clarification.

## 2024-11-26 ENCOUNTER — OFFICE VISIT (OUTPATIENT)
Dept: INTERNAL MEDICINE | Facility: CLINIC | Age: 69
End: 2024-11-26
Payer: MEDICARE

## 2024-11-26 ENCOUNTER — TELEPHONE (OUTPATIENT)
Dept: INTERNAL MEDICINE | Facility: CLINIC | Age: 69
End: 2024-11-26

## 2024-11-26 VITALS
HEART RATE: 88 BPM | TEMPERATURE: 97 F | OXYGEN SATURATION: 98 % | RESPIRATION RATE: 18 BRPM | DIASTOLIC BLOOD PRESSURE: 80 MMHG | WEIGHT: 110.56 LBS | HEIGHT: 58 IN | SYSTOLIC BLOOD PRESSURE: 130 MMHG | BODY MASS INDEX: 23.21 KG/M2

## 2024-11-26 DIAGNOSIS — J30.9 ALLERGIC RHINITIS, UNSPECIFIED SEASONALITY, UNSPECIFIED TRIGGER: ICD-10-CM

## 2024-11-26 DIAGNOSIS — N18.32 TYPE 2 DIABETES MELLITUS WITH STAGE 3B CHRONIC KIDNEY DISEASE, WITHOUT LONG-TERM CURRENT USE OF INSULIN: ICD-10-CM

## 2024-11-26 DIAGNOSIS — M81.0 OSTEOPOROSIS, UNSPECIFIED OSTEOPOROSIS TYPE, UNSPECIFIED PATHOLOGICAL FRACTURE PRESENCE: ICD-10-CM

## 2024-11-26 DIAGNOSIS — I10 ESSENTIAL HYPERTENSION: Primary | ICD-10-CM

## 2024-11-26 DIAGNOSIS — N18.32 STAGE 3B CHRONIC KIDNEY DISEASE: ICD-10-CM

## 2024-11-26 DIAGNOSIS — Z12.11 COLON CANCER SCREENING: ICD-10-CM

## 2024-11-26 DIAGNOSIS — Z23 NEED FOR VACCINATION: ICD-10-CM

## 2024-11-26 DIAGNOSIS — E11.22 TYPE 2 DIABETES MELLITUS WITH STAGE 3B CHRONIC KIDNEY DISEASE, WITHOUT LONG-TERM CURRENT USE OF INSULIN: ICD-10-CM

## 2024-11-26 DIAGNOSIS — E78.5 HYPERLIPIDEMIA, UNSPECIFIED HYPERLIPIDEMIA TYPE: ICD-10-CM

## 2024-11-26 PROCEDURE — 99214 OFFICE O/P EST MOD 30 MIN: CPT | Mod: S$GLB,,, | Performed by: INTERNAL MEDICINE

## 2024-11-26 PROCEDURE — 99999 PR PBB SHADOW E&M-EST. PATIENT-LVL IV: CPT | Mod: PBBFAC,,, | Performed by: INTERNAL MEDICINE

## 2024-11-26 PROCEDURE — 90653 IIV ADJUVANT VACCINE IM: CPT | Mod: S$GLB,,, | Performed by: INTERNAL MEDICINE

## 2024-11-26 PROCEDURE — G0008 ADMIN INFLUENZA VIRUS VAC: HCPCS | Mod: S$GLB,,, | Performed by: INTERNAL MEDICINE

## 2024-11-26 PROCEDURE — 3044F HG A1C LEVEL LT 7.0%: CPT | Mod: CPTII,S$GLB,, | Performed by: INTERNAL MEDICINE

## 2024-11-26 PROCEDURE — 1159F MED LIST DOCD IN RCRD: CPT | Mod: CPTII,S$GLB,, | Performed by: INTERNAL MEDICINE

## 2024-11-26 PROCEDURE — 3288F FALL RISK ASSESSMENT DOCD: CPT | Mod: CPTII,S$GLB,, | Performed by: INTERNAL MEDICINE

## 2024-11-26 PROCEDURE — 3079F DIAST BP 80-89 MM HG: CPT | Mod: CPTII,S$GLB,, | Performed by: INTERNAL MEDICINE

## 2024-11-26 PROCEDURE — 3066F NEPHROPATHY DOC TX: CPT | Mod: CPTII,S$GLB,, | Performed by: INTERNAL MEDICINE

## 2024-11-26 PROCEDURE — 1101F PT FALLS ASSESS-DOCD LE1/YR: CPT | Mod: CPTII,S$GLB,, | Performed by: INTERNAL MEDICINE

## 2024-11-26 PROCEDURE — 4010F ACE/ARB THERAPY RXD/TAKEN: CPT | Mod: CPTII,S$GLB,, | Performed by: INTERNAL MEDICINE

## 2024-11-26 PROCEDURE — 1126F AMNT PAIN NOTED NONE PRSNT: CPT | Mod: CPTII,S$GLB,, | Performed by: INTERNAL MEDICINE

## 2024-11-26 PROCEDURE — 3008F BODY MASS INDEX DOCD: CPT | Mod: CPTII,S$GLB,, | Performed by: INTERNAL MEDICINE

## 2024-11-26 PROCEDURE — 3077F SYST BP >= 140 MM HG: CPT | Mod: CPTII,S$GLB,, | Performed by: INTERNAL MEDICINE

## 2024-11-26 PROCEDURE — 3061F NEG MICROALBUMINURIA REV: CPT | Mod: CPTII,S$GLB,, | Performed by: INTERNAL MEDICINE

## 2024-11-26 RX ORDER — CETIRIZINE HYDROCHLORIDE 10 MG/1
10 TABLET ORAL DAILY
Qty: 30 TABLET | Refills: 6 | Status: SHIPPED | OUTPATIENT
Start: 2024-11-26 | End: 2025-11-26

## 2024-11-26 RX ORDER — IRBESARTAN 300 MG/1
300 TABLET ORAL DAILY
Qty: 90 TABLET | Refills: 1 | Status: SHIPPED | OUTPATIENT
Start: 2024-11-26

## 2024-11-26 RX ORDER — SIMVASTATIN 40 MG/1
40 TABLET, FILM COATED ORAL NIGHTLY
Qty: 90 TABLET | Refills: 1 | Status: SHIPPED | OUTPATIENT
Start: 2024-11-26

## 2024-11-26 RX ORDER — METFORMIN HYDROCHLORIDE 500 MG/1
500 TABLET ORAL 2 TIMES DAILY WITH MEALS
Qty: 180 TABLET | Refills: 1 | Status: SHIPPED | OUTPATIENT
Start: 2024-11-26

## 2024-11-26 RX ORDER — FENOFIBRATE 160 MG/1
160 TABLET ORAL DAILY
Qty: 90 TABLET | Refills: 1 | Status: SHIPPED | OUTPATIENT
Start: 2024-11-26

## 2024-11-26 RX ORDER — CETIRIZINE HYDROCHLORIDE 10 MG/1
TABLET ORAL
COMMUNITY
Start: 2024-08-13

## 2024-11-26 RX ORDER — CARVEDILOL 6.25 MG/1
6.25 TABLET ORAL 2 TIMES DAILY WITH MEALS
Qty: 180 TABLET | Refills: 1 | Status: SHIPPED | OUTPATIENT
Start: 2024-11-26

## 2024-11-27 DIAGNOSIS — Z78.0 MENOPAUSE: ICD-10-CM

## 2024-11-27 NOTE — TELEPHONE ENCOUNTER
Zofia- would you please call Mrs Cooper  and remind her to try the Zyrtec 10mg at bedtime x the next 4-6 weeks.  If this doesn't help her sinus congestion/allergic rhinitis symptoms, she should call us back.  Thanks

## 2024-12-04 DIAGNOSIS — Z78.0 MENOPAUSE: ICD-10-CM

## 2024-12-12 DIAGNOSIS — Z78.0 MENOPAUSE: ICD-10-CM

## 2024-12-23 DIAGNOSIS — Z78.0 MENOPAUSE: ICD-10-CM

## 2024-12-31 DIAGNOSIS — Z78.0 MENOPAUSE: ICD-10-CM

## 2025-01-07 ENCOUNTER — PATIENT OUTREACH (OUTPATIENT)
Dept: ADMINISTRATIVE | Facility: HOSPITAL | Age: 70
End: 2025-01-07
Payer: MEDICARE

## 2025-01-09 DIAGNOSIS — N18.32 TYPE 2 DIABETES MELLITUS WITH STAGE 3B CHRONIC KIDNEY DISEASE, WITHOUT LONG-TERM CURRENT USE OF INSULIN: ICD-10-CM

## 2025-01-09 DIAGNOSIS — E11.22 TYPE 2 DIABETES MELLITUS WITH STAGE 3B CHRONIC KIDNEY DISEASE, WITHOUT LONG-TERM CURRENT USE OF INSULIN: ICD-10-CM

## 2025-01-09 RX ORDER — METFORMIN HYDROCHLORIDE 500 MG/1
500 TABLET ORAL 2 TIMES DAILY WITH MEALS
Qty: 180 TABLET | Refills: 1 | Status: SHIPPED | OUTPATIENT
Start: 2025-01-09

## 2025-01-09 NOTE — TELEPHONE ENCOUNTER
No care due was identified.  [facility] Embedded Care Due Messages. Reference number: [id number].   1/09/2025 7:12:59 AM CST

## 2025-01-09 NOTE — TELEPHONE ENCOUNTER
Refill Routing Note   Medication(s) are not appropriate for processing by [facility] for the following reason(s):        Required labs abnormal    [facility] action(s):  Defer           Pharmacist review requested: Yes     Appointments  past 12m or future 3m with PCP    Date Provider   Last Visit   11/26/2024 Iban Martinez MD   Next Visit   5/30/2025 Iban Martinez MD   ED visits in past 90 days: 0        Note composed:8:11 AM 01/09/2025

## 2025-01-09 NOTE — TELEPHONE ENCOUNTER
Refill Decision Note   Ebony Angel  is requesting a refill authorization.  Brief Assessment and Rationale for Refill:  Approve     Medication Therapy Plan:         Pharmacist review requested: Yes   Extended chart review required: Yes   Comments:     Note composed:1:00 PM 01/09/2025

## 2025-02-22 DIAGNOSIS — Z00.00 ENCOUNTER FOR MEDICARE ANNUAL WELLNESS EXAM: ICD-10-CM

## 2025-02-26 ENCOUNTER — TELEPHONE (OUTPATIENT)
Dept: INTERNAL MEDICINE | Facility: CLINIC | Age: 70
End: 2025-02-26
Payer: MEDICARE

## 2025-02-26 DIAGNOSIS — N18.32 TYPE 2 DIABETES MELLITUS WITH STAGE 3B CHRONIC KIDNEY DISEASE, WITHOUT LONG-TERM CURRENT USE OF INSULIN: Primary | ICD-10-CM

## 2025-02-26 DIAGNOSIS — I10 ESSENTIAL HYPERTENSION: ICD-10-CM

## 2025-02-26 DIAGNOSIS — E11.22 TYPE 2 DIABETES MELLITUS WITH STAGE 3B CHRONIC KIDNEY DISEASE, WITHOUT LONG-TERM CURRENT USE OF INSULIN: Primary | ICD-10-CM

## 2025-02-26 DIAGNOSIS — E78.5 HYPERLIPIDEMIA, UNSPECIFIED HYPERLIPIDEMIA TYPE: ICD-10-CM

## 2025-02-26 NOTE — TELEPHONE ENCOUNTER
"----- Message from Anjel sent at 2/26/2025  2:22 PM CST -----  Contact: [phone]  type: Jeancarlosler is requesting to schedule their Lab appointment prior to an appointment.Order is not listed in EPIC.  Please enter order and contact patient to schedule.Preferred Date and Time of Labs:Week Before AptDate of Appointment:August 28 2025Where would they like the lab performed?[place] Location Would the patient rather a call back or a response via [facility]? Call back Best Call Back Number:[phone] Information:"Do not send messages requesting lab orders prior to Physical appt on these providers: Amita Clayton, Natalia Velazquez,  Sigrid Al and Jignesh."  "

## 2025-04-01 DIAGNOSIS — E11.22 TYPE 2 DIABETES MELLITUS WITH STAGE 3B CHRONIC KIDNEY DISEASE, WITHOUT LONG-TERM CURRENT USE OF INSULIN: ICD-10-CM

## 2025-04-01 DIAGNOSIS — I10 ESSENTIAL HYPERTENSION: ICD-10-CM

## 2025-04-01 DIAGNOSIS — N18.32 TYPE 2 DIABETES MELLITUS WITH STAGE 3B CHRONIC KIDNEY DISEASE, WITHOUT LONG-TERM CURRENT USE OF INSULIN: ICD-10-CM

## 2025-04-01 DIAGNOSIS — E78.5 HYPERLIPIDEMIA, UNSPECIFIED HYPERLIPIDEMIA TYPE: ICD-10-CM

## 2025-04-01 RX ORDER — SIMVASTATIN 40 MG/1
40 TABLET, FILM COATED ORAL NIGHTLY
Qty: 90 TABLET | Refills: 2 | Status: SHIPPED | OUTPATIENT
Start: 2025-04-01

## 2025-04-01 RX ORDER — CARVEDILOL 6.25 MG/1
6.25 TABLET ORAL 2 TIMES DAILY WITH MEALS
Qty: 180 TABLET | Refills: 2 | Status: SHIPPED | OUTPATIENT
Start: 2025-04-01

## 2025-04-01 RX ORDER — FENOFIBRATE 160 MG/1
160 TABLET ORAL
Qty: 90 TABLET | Refills: 3 | Status: SHIPPED | OUTPATIENT
Start: 2025-04-01

## 2025-04-01 RX ORDER — METFORMIN HYDROCHLORIDE 500 MG/1
500 TABLET ORAL 2 TIMES DAILY WITH MEALS
Qty: 180 TABLET | Refills: 3 | Status: SHIPPED | OUTPATIENT
Start: 2025-04-01

## 2025-04-01 NOTE — TELEPHONE ENCOUNTER
Care Due:                  Date            Visit Type   Department     Provider  --------------------------------------------------------------------------------                                EP -                              PRIMARY      [facility] INTERNAL  Last Visit: 11-      CARE ([facility])   [facility]       [facility]                              PRIMARY      [facility] INTERNAL  Next Visit: 09-      [facility] ([facility])   [facility]                                                            Last  Test          Frequency    Reason                     Performed    Due Date  --------------------------------------------------------------------------------    CBC.........  12 months..  fenofibrate..............  05- 05-    HBA1C.......  6 months...  metFORMIN................  11- 05-    [facility] Embedded Care Due Messages. Reference number: [id number].   4/01/2025 8:27:35 AM CDT

## 2025-04-01 NOTE — TELEPHONE ENCOUNTER
Refill Routing Note   Medication(s) are not appropriate for processing by [facility] for the following reason(s):        Required labs abnormal: CrCl 26 ml/min  Drug-disease interaction: Tricor & CKD    [facility] action(s):  Defer  Approve   Future labs scheduled 8/25/25            Appointments  past 12m or future 3m with PCP    Date Provider   Last Visit   11/26/2024 Iban Martinez MD   Next Visit   9/4/2025 Iban Martinez MD   ED visits in past 90 days: 0        Note composed:2:46 PM 04/01/2025

## 2025-04-11 ENCOUNTER — TELEPHONE (OUTPATIENT)
Dept: INTERNAL MEDICINE | Facility: CLINIC | Age: 70
End: 2025-04-11
Payer: MEDICARE

## 2025-04-11 NOTE — TELEPHONE ENCOUNTER
Zofia-Would you please let Allison know that her Cologuard test from 3/15/25 was Negative.Thanks so much

## 2025-05-27 DIAGNOSIS — I10 ESSENTIAL HYPERTENSION: ICD-10-CM

## 2025-05-27 RX ORDER — IRBESARTAN 300 MG/1
TABLET ORAL
Qty: 90 TABLET | Refills: 1 | Status: SHIPPED | OUTPATIENT
Start: 2025-05-27

## 2025-05-27 NOTE — TELEPHONE ENCOUNTER
No care due was identified.  [facility] Embedded Care Due Messages. Reference number: [id number].   5/27/2025 7:19:21 AM CDT

## 2025-05-27 NOTE — TELEPHONE ENCOUNTER
Refill Decision Note   Ebony Angel  is requesting a refill authorization.  Brief Assessment and Rationale for Refill:  Approve     Medication Therapy Plan:         Comments:     Note composed:7:34 AM 05/27/2025

## 2025-05-28 DIAGNOSIS — J30.9 ALLERGIC RHINITIS, UNSPECIFIED SEASONALITY, UNSPECIFIED TRIGGER: ICD-10-CM

## 2025-05-28 RX ORDER — CETIRIZINE HYDROCHLORIDE 10 MG/1
10 TABLET, FILM COATED ORAL
Qty: 90 TABLET | Refills: 1 | Status: SHIPPED | OUTPATIENT
Start: 2025-05-28

## 2025-05-28 NOTE — TELEPHONE ENCOUNTER
No care due was identified.  [facility] Embedded Care Due Messages. Reference number: [id number].   5/28/2025 7:03:42 AM CDT

## 2025-05-28 NOTE — TELEPHONE ENCOUNTER
Refill Routing Note   Medication(s) are not appropriate for processing by [facility] for the following reason(s):        Clarification of medication (Rx) details    [facility] action(s):  Defer      Medication Therapy Plan: PRN USE OF THIS MEDICATION OR DAILY? CHART REFLECTS BOTH; PLEASE ADVISE      Appointments  past 12m or future 3m with PCP    Date Provider   Last Visit   11/26/2024 Iban Martinez MD   Next Visit   9/4/2025 Iban Martinez MD   ED visits in past 90 days: 0        Note composed:9:17 AM 05/28/2025

## 2025-06-18 DIAGNOSIS — Z78.0 MENOPAUSE: ICD-10-CM

## 2025-06-19 ENCOUNTER — PATIENT OUTREACH (OUTPATIENT)
Dept: ADMINISTRATIVE | Facility: HOSPITAL | Age: 70
End: 2025-06-19
Payer: MEDICARE

## 2025-06-19 DIAGNOSIS — E11.22 TYPE 2 DIABETES MELLITUS WITH STAGE 3A CHRONIC KIDNEY DISEASE, WITHOUT LONG-TERM CURRENT USE OF INSULIN: Primary | ICD-10-CM

## 2025-06-19 DIAGNOSIS — N18.31 TYPE 2 DIABETES MELLITUS WITH STAGE 3A CHRONIC KIDNEY DISEASE, WITHOUT LONG-TERM CURRENT USE OF INSULIN: Primary | ICD-10-CM

## 2025-07-17 ENCOUNTER — PATIENT OUTREACH (OUTPATIENT)
Dept: ADMINISTRATIVE | Facility: HOSPITAL | Age: 70
End: 2025-07-17
Payer: MEDICARE

## 2025-07-17 NOTE — LETTER
AUTHORIZATION FOR RELEASE OF   CONFIDENTIAL INFORMATION    Dear Dr. Wood,    We are seeing Ebony Angel, date of birth 1955, in the clinic at [facility]. Iban Martinez MD is the patient's PCP. Ebnoy Angel has an outstanding lab/procedure at the time we reviewed her chart. In order to help keep her health information updated, she has authorized us to request the following medical record(s):        (  )  MAMMOGRAM                                      (  )  COLONOSCOPY      (  )  PAP SMEAR                                          (  )  OUTSIDE LAB RESULTS     (  )  DEXA SCAN                                          ( x )  EYE EXAM            (  )  FOOT EXAM                                          (  )  ENTIRE RECORD     (  )  OUTSIDE IMMUNIZATIONS                 (  )  _______________         Please fax records to Iban Martinez MD,  at [phone] or email to [email or web address].      Patient Name: Ebony Angel  : 1955  Patient Phone #: [phone]

## 2025-08-15 DIAGNOSIS — I10 ESSENTIAL HYPERTENSION: ICD-10-CM

## 2025-08-15 RX ORDER — IRBESARTAN 300 MG/1
300 TABLET ORAL DAILY
Qty: 90 TABLET | Refills: 1 | Status: SHIPPED | OUTPATIENT
Start: 2025-08-15

## 2025-08-25 PROCEDURE — 82570 ASSAY OF URINE CREATININE: CPT | Performed by: INTERNAL MEDICINE

## 2025-09-04 ENCOUNTER — TELEPHONE (OUTPATIENT)
Dept: INTERNAL MEDICINE | Facility: CLINIC | Age: 70
End: 2025-09-04

## 2025-09-04 ENCOUNTER — OFFICE VISIT (OUTPATIENT)
Dept: INTERNAL MEDICINE | Facility: CLINIC | Age: 70
End: 2025-09-04
Payer: MEDICARE

## 2025-09-04 VITALS
WEIGHT: 113.31 LBS | RESPIRATION RATE: 16 BRPM | BODY MASS INDEX: 23.79 KG/M2 | OXYGEN SATURATION: 98 % | HEART RATE: 83 BPM | TEMPERATURE: 97 F | SYSTOLIC BLOOD PRESSURE: 120 MMHG | DIASTOLIC BLOOD PRESSURE: 70 MMHG | HEIGHT: 58 IN

## 2025-09-04 DIAGNOSIS — F41.9 ANXIETY: ICD-10-CM

## 2025-09-04 DIAGNOSIS — E11.22 TYPE 2 DIABETES MELLITUS WITH STAGE 3 CHRONIC KIDNEY DISEASE, WITHOUT LONG-TERM CURRENT USE OF INSULIN, UNSPECIFIED WHETHER STAGE 3A OR 3B CKD: Primary | ICD-10-CM

## 2025-09-04 DIAGNOSIS — E78.5 HYPERLIPIDEMIA, UNSPECIFIED HYPERLIPIDEMIA TYPE: ICD-10-CM

## 2025-09-04 DIAGNOSIS — M81.0 OSTEOPOROSIS, UNSPECIFIED OSTEOPOROSIS TYPE, UNSPECIFIED PATHOLOGICAL FRACTURE PRESENCE: ICD-10-CM

## 2025-09-04 DIAGNOSIS — Z12.31 ENCOUNTER FOR SCREENING MAMMOGRAM FOR MALIGNANT NEOPLASM OF BREAST: ICD-10-CM

## 2025-09-04 DIAGNOSIS — N18.31 STAGE 3A CHRONIC KIDNEY DISEASE: ICD-10-CM

## 2025-09-04 DIAGNOSIS — F43.9 SITUATIONAL STRESS: ICD-10-CM

## 2025-09-04 DIAGNOSIS — N18.30 TYPE 2 DIABETES MELLITUS WITH STAGE 3 CHRONIC KIDNEY DISEASE, WITHOUT LONG-TERM CURRENT USE OF INSULIN, UNSPECIFIED WHETHER STAGE 3A OR 3B CKD: Primary | ICD-10-CM

## 2025-09-04 DIAGNOSIS — I10 ESSENTIAL HYPERTENSION: ICD-10-CM

## 2025-09-04 PROCEDURE — 3008F BODY MASS INDEX DOCD: CPT | Mod: CPTII,S$GLB,, | Performed by: INTERNAL MEDICINE

## 2025-09-04 PROCEDURE — 1159F MED LIST DOCD IN RCRD: CPT | Mod: CPTII,S$GLB,, | Performed by: INTERNAL MEDICINE

## 2025-09-04 PROCEDURE — 3288F FALL RISK ASSESSMENT DOCD: CPT | Mod: CPTII,S$GLB,, | Performed by: INTERNAL MEDICINE

## 2025-09-04 PROCEDURE — 3061F NEG MICROALBUMINURIA REV: CPT | Mod: CPTII,S$GLB,, | Performed by: INTERNAL MEDICINE

## 2025-09-04 PROCEDURE — 3078F DIAST BP <80 MM HG: CPT | Mod: CPTII,S$GLB,, | Performed by: INTERNAL MEDICINE

## 2025-09-04 PROCEDURE — 99999 PR PBB SHADOW E&M-EST. PATIENT-LVL V: CPT | Mod: PBBFAC,,, | Performed by: INTERNAL MEDICINE

## 2025-09-04 PROCEDURE — 99214 OFFICE O/P EST MOD 30 MIN: CPT | Mod: S$GLB,,, | Performed by: INTERNAL MEDICINE

## 2025-09-04 PROCEDURE — 1101F PT FALLS ASSESS-DOCD LE1/YR: CPT | Mod: CPTII,S$GLB,, | Performed by: INTERNAL MEDICINE

## 2025-09-04 PROCEDURE — 3044F HG A1C LEVEL LT 7.0%: CPT | Mod: CPTII,S$GLB,, | Performed by: INTERNAL MEDICINE

## 2025-09-04 PROCEDURE — 4010F ACE/ARB THERAPY RXD/TAKEN: CPT | Mod: CPTII,S$GLB,, | Performed by: INTERNAL MEDICINE

## 2025-09-04 PROCEDURE — 1126F AMNT PAIN NOTED NONE PRSNT: CPT | Mod: CPTII,S$GLB,, | Performed by: INTERNAL MEDICINE

## 2025-09-04 PROCEDURE — 3074F SYST BP LT 130 MM HG: CPT | Mod: CPTII,S$GLB,, | Performed by: INTERNAL MEDICINE

## 2025-09-04 PROCEDURE — 3066F NEPHROPATHY DOC TX: CPT | Mod: CPTII,S$GLB,, | Performed by: INTERNAL MEDICINE

## 2025-09-04 RX ORDER — BUSPIRONE HYDROCHLORIDE 10 MG/1
TABLET ORAL
Qty: 30 TABLET | Refills: 0 | Status: SHIPPED | OUTPATIENT
Start: 2025-09-04

## 2025-09-06 ENCOUNTER — TELEPHONE (OUTPATIENT)
Dept: INTERNAL MEDICINE | Facility: CLINIC | Age: 70
End: 2025-09-06
Payer: MEDICARE